# Patient Record
Sex: MALE | Race: WHITE | NOT HISPANIC OR LATINO | Employment: OTHER | URBAN - METROPOLITAN AREA
[De-identification: names, ages, dates, MRNs, and addresses within clinical notes are randomized per-mention and may not be internally consistent; named-entity substitution may affect disease eponyms.]

---

## 2017-10-17 ENCOUNTER — OFFICE VISIT (OUTPATIENT)
Dept: NEUROSURGERY | Facility: CLINIC | Age: 82
End: 2017-10-17

## 2017-10-17 VITALS
WEIGHT: 183 LBS | BODY MASS INDEX: 26.2 KG/M2 | DIASTOLIC BLOOD PRESSURE: 78 MMHG | TEMPERATURE: 98.2 F | SYSTOLIC BLOOD PRESSURE: 136 MMHG | HEIGHT: 70 IN

## 2017-10-17 DIAGNOSIS — M47.812 CERVICAL SPONDYLOSIS WITHOUT MYELOPATHY: ICD-10-CM

## 2017-10-17 DIAGNOSIS — I63.9 CEREBROVASCULAR ACCIDENT (CVA), UNSPECIFIED MECHANISM (HCC): ICD-10-CM

## 2017-10-17 DIAGNOSIS — I65.23 CAROTID STENOSIS, BILATERAL: Primary | ICD-10-CM

## 2017-10-17 PROCEDURE — 99214 OFFICE O/P EST MOD 30 MIN: CPT | Performed by: PHYSICIAN ASSISTANT

## 2017-10-17 RX ORDER — ASPIRIN 81 MG/1
81 TABLET ORAL DAILY
COMMUNITY
End: 2023-01-24

## 2017-10-17 RX ORDER — ROSUVASTATIN CALCIUM 10 MG/1
10 TABLET, COATED ORAL DAILY
COMMUNITY
Start: 2017-10-03

## 2017-10-17 RX ORDER — OMEPRAZOLE 20 MG/1
20 CAPSULE, DELAYED RELEASE ORAL DAILY
COMMUNITY
End: 2023-01-24

## 2017-10-17 RX ORDER — LISINOPRIL 10 MG/1
TABLET ORAL
Status: ON HOLD | COMMUNITY
Start: 2017-08-14 | End: 2023-01-24

## 2017-10-17 RX ORDER — DOXAZOSIN MESYLATE 4 MG/1
TABLET ORAL
COMMUNITY
Start: 2017-09-04 | End: 2023-01-24

## 2017-10-17 NOTE — PROGRESS NOTES
Patient: Jose Penn  : 1928  Chart #: [id number]    Date of Service: 10/17/2017    CHIEF COMPLAINT: Carotid stenosis     History of Present Illness Mr. Penn is an 89 year old gentleman who has a history of asymptomatic carotid stenosis with severe greater than 70%, near 90% stenosis proximal left internal carotid artery, considered stable.  Per patient's report, he apparently suffered a right sided TIA in November of last year.  He felt like he suffered a similar event 3 weeks ago but was not evaluated by a doctor.  He suddenly felt dizzy and transiently had blurred vision.  No focal neurological deficit.  He has not had recent duplex studies.      The following portions of the patient's history were reviewed and updated as appropriate: allergies, current medications, past family history, past medical history, past social history, past surgical history and problem list.    Review of Systems   Constitutional: Positive for activity change, appetite change and fatigue. Negative for chills, diaphoresis, fever and unexpected weight change.   HENT: Positive for hearing loss. Negative for congestion, dental problem, drooling, ear discharge, ear pain, facial swelling, mouth sores, nosebleeds, postnasal drip, rhinorrhea, sinus pressure, sneezing, sore throat, tinnitus, trouble swallowing and voice change.    Eyes: Positive for visual disturbance. Negative for photophobia, pain, discharge, redness and itching.   Respiratory: Negative for apnea, cough, choking, chest tightness, shortness of breath, wheezing and stridor.    Cardiovascular: Negative for chest pain, palpitations and leg swelling.   Gastrointestinal: Negative for abdominal distention, abdominal pain, anal bleeding, blood in stool, constipation, diarrhea, nausea, rectal pain and vomiting.   Endocrine: Negative for cold intolerance, heat intolerance, polydipsia, polyphagia and polyuria.   Genitourinary: Negative for decreased urine volume, difficulty  "urinating, dysuria, enuresis, flank pain, frequency, genital sores, hematuria and urgency.   Musculoskeletal: Negative for arthralgias, back pain, gait problem, joint swelling, myalgias, neck pain and neck stiffness.   Skin: Negative for color change, pallor, rash and wound.   Allergic/Immunologic: Negative for environmental allergies, food allergies and immunocompromised state.   Neurological: Positive for dizziness and headaches. Negative for tremors, seizures, syncope, facial asymmetry, speech difficulty, weakness, light-headedness and numbness.   Hematological: Negative for adenopathy. Bruises/bleeds easily.   Psychiatric/Behavioral: Negative for agitation, behavioral problems, confusion, decreased concentration, dysphoric mood, hallucinations, self-injury, sleep disturbance and suicidal ideas. The patient is not nervous/anxious and is not hyperactive.        Objective   Vital Signs: Blood pressure 136/78, temperature 98.2 °F (36.8 °C), height 70\" (177.8 cm), weight 183 lb (83 kg).  Physical Exam   Constitutional: He is oriented to person, place, and time. He appears well-developed and well-nourished. No distress.   HENT:   Head: Normocephalic and atraumatic.   Eyes: EOM are normal. Pupils are equal, round, and reactive to light.   Neck: Carotid bruit is not present.   Cardiovascular: Normal rate, regular rhythm and normal heart sounds.    Pulmonary/Chest: Effort normal and breath sounds normal.   Abdominal: Soft. He exhibits no distension. There is no tenderness.   Musculoskeletal: Normal range of motion. He exhibits no tenderness or deformity.   Neurological: He is oriented to person, place, and time. No cranial nerve deficit. Coordination normal.   Psychiatric: He has a normal mood and affect. His behavior is normal. Thought content normal.   Nursing note and vitals reviewed.      Assessment/Plan    Diagnosis: Carotid stenosis     Plan:  MRI of brain with and without contrast and bilateral carotid duplex. " Patient will follow up with Dr. Diaz on the same day and further recommendations will be made.                Latoya Love PA-C  Patient Care Team:  Anil King MD as PCP - General (Family Medicine)  Anil King MD as Referring Physician (Family Medicine)

## 2017-11-09 ENCOUNTER — OFFICE VISIT (OUTPATIENT)
Dept: NEUROSURGERY | Facility: CLINIC | Age: 82
End: 2017-11-09

## 2017-11-09 ENCOUNTER — HOSPITAL ENCOUNTER (OUTPATIENT)
Dept: MRI IMAGING | Facility: HOSPITAL | Age: 82
Discharge: HOME | End: 2017-11-09

## 2017-11-09 ENCOUNTER — HOSPITAL ENCOUNTER (OUTPATIENT)
Dept: CARDIOLOGY | Facility: HOSPITAL | Age: 82
Discharge: HOME | End: 2017-11-09
Admitting: PHYSICIAN ASSISTANT

## 2017-11-09 VITALS
WEIGHT: 183.2 LBS | BODY MASS INDEX: 27.13 KG/M2 | TEMPERATURE: 98.5 F | DIASTOLIC BLOOD PRESSURE: 68 MMHG | SYSTOLIC BLOOD PRESSURE: 132 MMHG | HEIGHT: 69 IN

## 2017-11-09 DIAGNOSIS — M47.812 CERVICAL SPONDYLOSIS WITHOUT MYELOPATHY: ICD-10-CM

## 2017-11-09 DIAGNOSIS — I65.29 STENOSIS OF CAROTID ARTERY, UNSPECIFIED LATERALITY: Primary | ICD-10-CM

## 2017-11-09 DIAGNOSIS — I63.9 CEREBROVASCULAR ACCIDENT (CVA), UNSPECIFIED MECHANISM (HCC): ICD-10-CM

## 2017-11-09 DIAGNOSIS — I65.23 CAROTID STENOSIS, BILATERAL: ICD-10-CM

## 2017-11-09 DIAGNOSIS — I79.8 OTHER DISORDERS OF ARTERIES, ARTERIOLES AND CAPILLARIES IN DISEASES CLASSIFIED ELSEWHERE (HCC): ICD-10-CM

## 2017-11-09 PROBLEM — R51.9 HEADACHE: Status: ACTIVE | Noted: 2017-11-09

## 2017-11-09 PROBLEM — M19.90 ARTHRITIS: Status: ACTIVE | Noted: 2017-11-09

## 2017-11-09 LAB
BH CV XLRA MEAS LEFT CCA RATIO VEL: 95.1 CM/SEC
BH CV XLRA MEAS LEFT DIST CCA EDV: 17.3 CM/SEC
BH CV XLRA MEAS LEFT DIST CCA PSV: 95.1 CM/SEC
BH CV XLRA MEAS LEFT DIST ICA EDV: 32.7 CM/SEC
BH CV XLRA MEAS LEFT DIST ICA PSV: 138 CM/SEC
BH CV XLRA MEAS LEFT ICA RATIO VEL: 314 CM/SEC
BH CV XLRA MEAS LEFT ICA/CCA RATIO: 3.3
BH CV XLRA MEAS LEFT MID CCA EDV: 22.6 CM/SEC
BH CV XLRA MEAS LEFT MID CCA PSV: 100 CM/SEC
BH CV XLRA MEAS LEFT MID ICA EDV: 79.8 CM/SEC
BH CV XLRA MEAS LEFT MID ICA PSV: 314 CM/SEC
BH CV XLRA MEAS LEFT PROX CCA EDV: 20.2 CM/SEC
BH CV XLRA MEAS LEFT PROX CCA PSV: 118 CM/SEC
BH CV XLRA MEAS LEFT PROX ECA PSV: 245 CM/SEC
BH CV XLRA MEAS LEFT PROX ICA EDV: 106 CM/SEC
BH CV XLRA MEAS LEFT PROX ICA PSV: 314 CM/SEC
BH CV XLRA MEAS LEFT PROX SCLA PSV: 201 CM/SEC
BH CV XLRA MEAS LEFT VERTEBRAL A PSV: 64.4 CM/SEC
BH CV XLRA MEAS RIGHT CCA RATIO VEL: 107 CM/SEC
BH CV XLRA MEAS RIGHT DIST CCA EDV: 26.2 CM/SEC
BH CV XLRA MEAS RIGHT DIST CCA PSV: 107 CM/SEC
BH CV XLRA MEAS RIGHT DIST ICA EDV: 26.2 CM/SEC
BH CV XLRA MEAS RIGHT DIST ICA PSV: 110 CM/SEC
BH CV XLRA MEAS RIGHT ICA RATIO VEL: 94.3 CM/SEC
BH CV XLRA MEAS RIGHT ICA/CCA RATIO: 0.88
BH CV XLRA MEAS RIGHT MID CCA EDV: 21 CM/SEC
BH CV XLRA MEAS RIGHT MID CCA PSV: 109 CM/SEC
BH CV XLRA MEAS RIGHT MID ICA EDV: 28.8 CM/SEC
BH CV XLRA MEAS RIGHT MID ICA PSV: 94.3 CM/SEC
BH CV XLRA MEAS RIGHT PROX CCA EDV: 24.4 CM/SEC
BH CV XLRA MEAS RIGHT PROX CCA PSV: 135 CM/SEC
BH CV XLRA MEAS RIGHT PROX ECA PSV: 159 CM/SEC
BH CV XLRA MEAS RIGHT PROX ICA EDV: 27.9 CM/SEC
BH CV XLRA MEAS RIGHT PROX ICA PSV: 87.3 CM/SEC
BH CV XLRA MEAS RIGHT PROX SCLA PSV: 115 CM/SEC
BH CV XLRA MEAS RIGHT VERTEBRAL A PSV: 34.7 CM/SEC
LEFT ARM BP: NORMAL MMHG
RIGHT ARM BP: NORMAL MMHG

## 2017-11-09 PROCEDURE — 70553 MRI BRAIN STEM W/O & W/DYE: CPT

## 2017-11-09 PROCEDURE — 0 GADOBENATE DIMEGLUMINE 529 MG/ML SOLUTION: Performed by: PHYSICIAN ASSISTANT

## 2017-11-09 PROCEDURE — 93880 EXTRACRANIAL BILAT STUDY: CPT | Performed by: INTERNAL MEDICINE

## 2017-11-09 PROCEDURE — 93880 EXTRACRANIAL BILAT STUDY: CPT

## 2017-11-09 PROCEDURE — 82565 ASSAY OF CREATININE: CPT

## 2017-11-09 PROCEDURE — A9577 INJ MULTIHANCE: HCPCS | Performed by: PHYSICIAN ASSISTANT

## 2017-11-09 PROCEDURE — 99214 OFFICE O/P EST MOD 30 MIN: CPT | Performed by: PHYSICIAN ASSISTANT

## 2017-11-09 RX ORDER — CLOPIDOGREL BISULFATE 75 MG/1
75 TABLET ORAL EVERY OTHER DAY
Qty: 30 TABLET | Refills: 6 | Status: ON HOLD | OUTPATIENT
Start: 2017-11-09 | End: 2023-01-24

## 2017-11-09 RX ADMIN — GADOBENATE DIMEGLUMINE 8 ML: 529 INJECTION, SOLUTION INTRAVENOUS at 15:12

## 2017-11-09 NOTE — PROGRESS NOTES
Subjective   Patient ID: Jose Penn is a 89 y.o. male  [id number]    Chief Complaint   Patient presents with   • Carotid Artery Disease     History of Present Illness  Mr. Penn is an 89 year old gentleman who has a history of asymptomatic carotid stenosis with severe greater than 70%, near 90% stenosis proximal left internal carotid artery, considered stable.  Per patient's report, he apparently suffered a right sided TIA in November of last year.  He felt like he suffered a similar event 3 weeks ago but was not evaluated by a doctor.  He suddenly felt dizzy and transiently had blurred vision.  No focal neurological deficit of weakness in the arm or leg.  He has new studies today for review. He has not been seen by opthalmology.    Current Outpatient Prescriptions:   •  aspirin 81 MG EC tablet, Take 81 mg by mouth Daily., Disp: , Rfl:   •  doxazosin (CARDURA) 4 MG tablet, , Disp: , Rfl:   •  lisinopril (PRINIVIL,ZESTRIL) 10 MG tablet, , Disp: , Rfl:   •  omeprazole (priLOSEC) 20 MG capsule, Take 20 mg by mouth Daily., Disp: , Rfl:   •  rosuvastatin (CRESTOR) 10 MG tablet, , Disp: , Rfl:   •  sertraline (ZOLOFT) 50 MG tablet, , Disp: , Rfl:   •  clopidogrel (PLAVIX) 75 MG tablet, Take 1 tablet by mouth Every Other Day., Disp: 30 tablet, Rfl: 6    Allergies   Allergen Reactions   • Droperidol    • Penicillins    • Reglan [Metoclopramide]    • Sulfa Antibiotics      Review of Systems   Constitutional: Negative for activity change, appetite change, chills, diaphoresis, fatigue, fever and unexpected weight change.   HENT: Negative for congestion, dental problem, drooling, ear discharge, ear pain, facial swelling, hearing loss, mouth sores, nosebleeds, postnasal drip, rhinorrhea, sinus pressure, sneezing, sore throat, tinnitus, trouble swallowing and voice change.    Eyes: Negative for photophobia, pain, discharge, redness, itching and visual disturbance.   Respiratory: Negative for apnea, cough, choking, chest  tightness, shortness of breath, wheezing and stridor.    Cardiovascular: Negative for chest pain, palpitations and leg swelling.   Gastrointestinal: Negative for abdominal distention, abdominal pain, anal bleeding, blood in stool, constipation, diarrhea, nausea, rectal pain and vomiting.   Endocrine: Negative for cold intolerance, heat intolerance, polydipsia, polyphagia and polyuria.   Genitourinary: Negative for decreased urine volume, difficulty urinating, dysuria, enuresis, flank pain, frequency, genital sores, hematuria and urgency.   Musculoskeletal: Negative for arthralgias, back pain, gait problem, joint swelling, myalgias, neck pain and neck stiffness.   Skin: Negative for color change, pallor, rash and wound.   Allergic/Immunologic: Negative for environmental allergies, food allergies and immunocompromised state.   Neurological: Positive for headaches. Negative for dizziness, tremors, seizures, syncope, facial asymmetry, speech difficulty, weakness, light-headedness and numbness.   Hematological: Negative for adenopathy. Does not bruise/bleed easily.   Psychiatric/Behavioral: Negative for agitation, behavioral problems, confusion, decreased concentration, dysphoric mood, hallucinations, self-injury, sleep disturbance and suicidal ideas. The patient is not nervous/anxious and is not hyperactive.    All other systems reviewed and are negative.      Physical Exam   Constitutional: He is oriented to person, place, and time. He appears well-developed and well-nourished.   HENT:   Head: Normocephalic and atraumatic.   Eyes: Conjunctivae are normal.   Neck: Neck supple.   Cardiovascular: Normal rate.    Pulmonary/Chest: Effort normal.   Neurological: He is alert and oriented to person, place, and time. Gait normal.   Skin: Skin is warm and dry.   Psychiatric: He has a normal mood and affect. His behavior is normal. Judgment and thought content normal.     /68 (BP Location: Left arm, Patient Position:  "Sitting, Cuff Size: Adult)  Temp 98.5 °F (36.9 °C) (Temporal Artery )   Ht 69\" (175.3 cm)  Wt 183 lb 3.2 oz (83.1 kg)  BMI 27.05 kg/m2    Neurologic Exam     Mental Status   Oriented to person, place, and time.     Cranial Nerves   Cranial nerves II through XII intact.     Gait, Coordination, and Reflexes     Gait  Gait: normal    Tremor   Resting tremor: absent  No carotid bruits are noted bilaterally.    Independent Review of Radiographic Studies:   MRI of the brain done 11/9/2017 is reviewed with Dr. Ramon in detail and does not show evidence of prior stroke, there are no flow voids noted.  His ventricles are of normal size, there is no hemorrhage, no abnormal contrast enhancement.    Medical Decision Making:   I have reviewed the MRI scan with Dr. Ramon who does not see evidence of a recent stroke therefore we would like to pursue further testing to determine if this is cardiac or other source in nature.I have asked the patient to see ophthalmology for an eye exam and call me after this is obtained so that we can review the results.  He is starting Plavix 75 mg every other day, I have reviewed the risk benefits and reasoning of this medication and all of their questions were answered.  Our plan will be a follow-up carotid study in 6 months.  We will also obtain a copy of the scan that he had at [facility] last year and evaluate as to any cardiac workup, if there is no cardiac evaluation there and we will set him up to see cardiology and plan on seeing him back in our office as scheduled.  His been a pleasure providing neurosurgical evaluation.    Valencia Avila PA-C                    "

## 2017-11-10 LAB — CREAT BLDA-MCNC: 1.5 MG/DL (ref 0.6–1.3)

## 2018-01-16 ENCOUNTER — DOCUMENTATION (OUTPATIENT)
Dept: NEUROSURGERY | Facility: CLINIC | Age: 83
End: 2018-01-16

## 2018-01-16 DIAGNOSIS — I65.22 STENOSIS OF LEFT CAROTID ARTERY: Primary | ICD-10-CM

## 2018-01-16 DIAGNOSIS — G45.9 TRANSIENT CEREBRAL ISCHEMIA, UNSPECIFIED TYPE: ICD-10-CM

## 2018-01-16 NOTE — PROGRESS NOTES
Patient overall is doing well.  He is taking his Plavix every other day and tolerating this without any difficulties.  Our plan is to see him back in March with repeat Doppler studies for comparison of his carotid Doppler and his left internal carotid artery stenosis.    Valencia Avila PA-C

## 2018-03-13 ENCOUNTER — HOSPITAL ENCOUNTER (OUTPATIENT)
Dept: CARDIOLOGY | Facility: HOSPITAL | Age: 83
Discharge: HOME | End: 2018-03-13
Admitting: PHYSICIAN ASSISTANT

## 2018-03-13 ENCOUNTER — OFFICE VISIT (OUTPATIENT)
Dept: NEUROSURGERY | Facility: CLINIC | Age: 83
End: 2018-03-13

## 2018-03-13 VITALS
DIASTOLIC BLOOD PRESSURE: 58 MMHG | BODY MASS INDEX: 27.11 KG/M2 | TEMPERATURE: 98 F | WEIGHT: 183 LBS | HEIGHT: 69 IN | SYSTOLIC BLOOD PRESSURE: 98 MMHG

## 2018-03-13 DIAGNOSIS — I63.9 CEREBROVASCULAR ACCIDENT (CVA), UNSPECIFIED MECHANISM (HCC): ICD-10-CM

## 2018-03-13 DIAGNOSIS — I65.23 BILATERAL CAROTID ARTERY STENOSIS: Primary | ICD-10-CM

## 2018-03-13 LAB
BH CV ECHO MEAS - BSA(HAYCOCK): 2 M^2
BH CV ECHO MEAS - BSA: 2 M^2
BH CV ECHO MEAS - BZI_BMI: 27 KILOGRAMS/M^2
BH CV ECHO MEAS - BZI_METRIC_HEIGHT: 175.3 CM
BH CV ECHO MEAS - BZI_METRIC_WEIGHT: 83 KG
BH CV XLRA MEAS LEFT CCA RATIO VEL: 103 CM/SEC
BH CV XLRA MEAS LEFT DIST CCA EDV: 19.9 CM/SEC
BH CV XLRA MEAS LEFT DIST CCA PSV: 88.8 CM/SEC
BH CV XLRA MEAS LEFT DIST ICA EDV: 37.7 CM/SEC
BH CV XLRA MEAS LEFT DIST ICA PSV: 179.8 CM/SEC
BH CV XLRA MEAS LEFT ICA RATIO VEL: 469 CM/SEC
BH CV XLRA MEAS LEFT ICA/CCA RATIO: 4.6
BH CV XLRA MEAS LEFT MID CCA EDV: 21 CM/SEC
BH CV XLRA MEAS LEFT MID CCA PSV: 103.1 CM/SEC
BH CV XLRA MEAS LEFT MID ICA EDV: 112 CM/SEC
BH CV XLRA MEAS LEFT MID ICA PSV: 436.1 CM/SEC
BH CV XLRA MEAS LEFT PROX CCA EDV: 21.6 CM/SEC
BH CV XLRA MEAS LEFT PROX CCA PSV: 136.5 CM/SEC
BH CV XLRA MEAS LEFT PROX ECA PSV: 209.9 CM/SEC
BH CV XLRA MEAS LEFT PROX ICA EDV: 167 CM/SEC
BH CV XLRA MEAS LEFT PROX ICA PSV: 469 CM/SEC
BH CV XLRA MEAS LEFT PROX SCLA PSV: 153.2 CM/SEC
BH CV XLRA MEAS LEFT VERTEBRAL A EDV: 14.8 CM/SEC
BH CV XLRA MEAS LEFT VERTEBRAL A PSV: 70.3 CM/SEC
BH CV XLRA MEAS RIGHT CCA RATIO VEL: 91.1 CM/SEC
BH CV XLRA MEAS RIGHT DIST CCA EDV: 20.1 CM/SEC
BH CV XLRA MEAS RIGHT DIST CCA PSV: 87.4 CM/SEC
BH CV XLRA MEAS RIGHT DIST ICA EDV: 33 CM/SEC
BH CV XLRA MEAS RIGHT DIST ICA PSV: 117.9 CM/SEC
BH CV XLRA MEAS RIGHT ICA RATIO VEL: 140 CM/SEC
BH CV XLRA MEAS RIGHT ICA/CCA RATIO: 1.5
BH CV XLRA MEAS RIGHT MID CCA EDV: 17.6 CM/SEC
BH CV XLRA MEAS RIGHT MID CCA PSV: 91.8 CM/SEC
BH CV XLRA MEAS RIGHT MID ICA EDV: 40.9 CM/SEC
BH CV XLRA MEAS RIGHT MID ICA PSV: 140 CM/SEC
BH CV XLRA MEAS RIGHT PROX CCA EDV: 20.1 CM/SEC
BH CV XLRA MEAS RIGHT PROX CCA PSV: 110 CM/SEC
BH CV XLRA MEAS RIGHT PROX ECA PSV: 148.4 CM/SEC
BH CV XLRA MEAS RIGHT PROX ICA EDV: 35.4 CM/SEC
BH CV XLRA MEAS RIGHT PROX ICA PSV: 122 CM/SEC
BH CV XLRA MEAS RIGHT PROX SCLA PSV: 121.8 CM/SEC
BH CV XLRA MEAS RIGHT VERTEBRAL A EDV: 9.8 CM/SEC
BH CV XLRA MEAS RIGHT VERTEBRAL A PSV: 44.2 CM/SEC
LEFT ARM BP: NORMAL MMHG
RIGHT ARM BP: NORMAL MMHG

## 2018-03-13 PROCEDURE — 93880 EXTRACRANIAL BILAT STUDY: CPT

## 2018-03-13 PROCEDURE — 93880 EXTRACRANIAL BILAT STUDY: CPT | Performed by: INTERNAL MEDICINE

## 2018-03-13 PROCEDURE — 99213 OFFICE O/P EST LOW 20 MIN: CPT | Performed by: PHYSICIAN ASSISTANT

## 2018-03-13 RX ORDER — POLYETHYLENE GLYCOL 3350 17 G/17G
POWDER, FOR SOLUTION ORAL
COMMUNITY
Start: 2018-01-03 | End: 2023-01-24

## 2018-03-13 RX ORDER — MONTELUKAST SODIUM 10 MG/1
TABLET ORAL
Status: ON HOLD | COMMUNITY
Start: 2018-02-19 | End: 2023-01-24

## 2018-03-13 RX ORDER — PANTOPRAZOLE SODIUM 40 MG/1
TABLET, DELAYED RELEASE ORAL
COMMUNITY
Start: 2018-03-10 | End: 2023-01-24

## 2018-03-14 NOTE — PROGRESS NOTES
Subjective   Patient: Jose Penn  : 1928  Chart #: [id number]    Date of Service: 2018    CHIEF COMPLAINT: HA's    HPI: patient with known left carotid stenosis that is was last seen in 2017, here for f/u with new carotid duplex. Since his last visit he has not had any new symptoms of ataxia, weakness, vision deficits, dizziness or speech problems. He has had some surgery on his eyelids and will require more later. His vision is stable. He has been feeling tired, no energy for several months. He has been found to have some low b/p in the past and was off his b/p meds for awhile not back on Lisinopril 10mg daily. Today his b/p is low.    PMH:   Past Medical History:   Diagnosis Date   • Arthritis    • Carotid stenosis    • Headache    • Hypertension    • Stroke        Social History:   Social History   Substance Use Topics   • Smoking status: Former Smoker     Quit date:    • Smokeless tobacco: Never Used   • Alcohol use No       Allergies: Droperidol; Penicillins; Reglan [metoclopramide]; and Sulfa antibiotics    Medications:   Current Outpatient Prescriptions:   •  aspirin 81 MG EC tablet, Take 81 mg by mouth Daily., Disp: , Rfl:   •  clopidogrel (PLAVIX) 75 MG tablet, Take 1 tablet by mouth Every Other Day., Disp: 30 tablet, Rfl: 6  •  doxazosin (CARDURA) 4 MG tablet, , Disp: , Rfl:   •  lisinopril (PRINIVIL,ZESTRIL) 10 MG tablet, , Disp: , Rfl:   •  montelukast (SINGULAIR) 10 MG tablet, , Disp: , Rfl:   •  omeprazole (priLOSEC) 20 MG capsule, Take 20 mg by mouth Daily., Disp: , Rfl:   •  pantoprazole (PROTONIX) 40 MG EC tablet, , Disp: , Rfl:   •  polyethylene glycol (MIRALAX) powder, , Disp: , Rfl:   •  rosuvastatin (CRESTOR) 10 MG tablet, , Disp: , Rfl:   •  sertraline (ZOLOFT) 50 MG tablet, , Disp: , Rfl:     Review of Systems  Constitutional: Positive for activity change, appetite change and fatigue. Negative for chills, diaphoresis, fever and unexpected weight change.   HENT: Positive  for hearing loss. Negative for congestion, dental problem, drooling, ear discharge, ear pain, facial swelling, mouth sores, nosebleeds, postnasal drip, rhinorrhea, sinus pressure, sneezing, sore throat, tinnitus, trouble swallowing and voice change.    Eyes: Positive for visual disturbance. Negative for photophobia, pain, discharge, redness and itching.   Respiratory: Negative for apnea, cough, choking, chest tightness, shortness of breath, wheezing and stridor.    Cardiovascular: Negative for chest pain, palpitations and leg swelling.   Gastrointestinal: Negative for abdominal distention, abdominal pain, anal bleeding, blood in stool, constipation, diarrhea, nausea, rectal pain and vomiting.   Endocrine: Negative for cold intolerance, heat intolerance, polydipsia, polyphagia and polyuria.   Genitourinary: Negative for decreased urine volume, difficulty urinating, dysuria, enuresis, flank pain, frequency, genital sores, hematuria and urgency.   Musculoskeletal: Negative for arthralgias, back pain, gait problem, joint swelling, myalgias, neck pain and neck stiffness.   Skin: Negative for color change, pallor, rash and wound.   Allergic/Immunologic: Negative for environmental allergies, food allergies and immunocompromised state.   Neurological: Positive for dizziness and headaches. Negative for tremors, seizures, syncope, facial asymmetry, speech difficulty, weakness, light-headedness and numbness.   Hematological: Negative for adenopathy. Bruises/bleeds easily.   Psychiatric/Behavioral: Negative for agitation, behavioral problems, confusion, decreased concentration, dysphoric mood, hallucinations, self-injury, sleep disturbance and suicidal ideas. The patient is not nervous/anxious and is not hyperactive.    Objective     Neurologic Exam     Mental Status   Oriented to person, place, and time.   Attention: normal.   Speech: speech is normal   Level of consciousness: alert  Knowledge: good.     Cranial Nerves    Cranial nerves II through XII intact.     Motor Exam   Muscle bulk: normal  Overall muscle tone: normal    Gait, Coordination, and Reflexes     Gait  Gait: normal    Tremor   Resting tremor: absent  Intention tremor: absent  Action tremor: absent      IMAGING/STUDIES: carotid duplex  Blood Pressure 142/70 mmHg       141/70 mmHg            Carotid Velocities - Right Side      Systolic Diastolic   CCA Prox 110 cm/sec       20.1 cm/sec         CCA Mid 91.8 cm/sec       17.6 cm/sec         CCA Dist 87.4 cm/sec       20.1 cm/sec         ICA Prox 122 cm/sec       35.4 cm/sec         ICA Mid 140 cm/sec       40.9 cm/sec         ICA Dist 117.9 cm/sec       33 cm/sec         .4 cm/sec             Vertebral 44.2 cm/sec       9.8 cm/sec         Subclavian 121.8 cm/sec             ICA/CCA 1.5                 Carotid Velocities - Left Side      Systolic Diastolic   CCA Prox 136.5 cm/sec       21.6 cm/sec         CCA Mid 103.1 cm/sec       21 cm/sec         CCA Dist 88.8 cm/sec       19.9 cm/sec         ICA Prox 469 cm/sec       167 cm/sec         ICA Mid 436.1 cm/sec       112 cm/sec         ICA Dist 179.8 cm/sec       37.7 cm/sec         .9 cm/sec             Vertebral 70.3 cm/sec       14.8 cm/sec         Subclavian 153.2 cm/sec             ICA/CCA 4.6                  Assessment/Plan   There has been some progression in the carotid stenosis on the left with increased velocities and ratios and mild progression on the right but thoses velocities are still within normal range. I will discuss and review the progression with Dr. Diaz and decide if will progress with further studies or continue to monitor. He is currently on Plavix QOD. I will call the patient after I discuss with Dr. Diaz.        Valencia Avila PA-C  Patient Care Team:  Anil King MD as PCP - General (Family Medicine)  Anil King MD as Referring Physician (Family Medicine)

## 2018-10-17 ENCOUNTER — HOSPITAL ENCOUNTER (OUTPATIENT)
Dept: CARDIOLOGY | Facility: HOSPITAL | Age: 83
Discharge: HOME | End: 2018-10-17
Attending: COLON & RECTAL SURGERY | Admitting: COLON & RECTAL SURGERY

## 2018-10-17 ENCOUNTER — TRANSCRIBE ORDERS (OUTPATIENT)
Dept: ADMINISTRATIVE | Facility: HOSPITAL | Age: 83
End: 2018-10-17

## 2018-10-17 DIAGNOSIS — I10 HYPERTENSION, UNSPECIFIED TYPE: ICD-10-CM

## 2018-10-17 DIAGNOSIS — I10 HYPERTENSION, UNSPECIFIED TYPE: Primary | ICD-10-CM

## 2018-10-17 PROCEDURE — 93010 ELECTROCARDIOGRAM REPORT: CPT | Performed by: INTERNAL MEDICINE

## 2018-10-17 PROCEDURE — 93005 ELECTROCARDIOGRAM TRACING: CPT | Performed by: COLON & RECTAL SURGERY

## 2019-12-13 ENCOUNTER — LAB REQUISITION (OUTPATIENT)
Dept: LAB | Facility: HOSPITAL | Age: 84
End: 2019-12-13

## 2019-12-13 DIAGNOSIS — K62.81 ANAL SPHINCTER TEAR (HEALED) (NONTRAUMATIC) (OLD): ICD-10-CM

## 2019-12-13 PROCEDURE — 88304 TISSUE EXAM BY PATHOLOGIST: CPT | Performed by: COLON & RECTAL SURGERY

## 2019-12-20 LAB
CYTO UR: NORMAL
LAB AP CASE REPORT: NORMAL
LAB AP CLINICAL INFORMATION: NORMAL
PATH REPORT.FINAL DX SPEC: NORMAL
PATH REPORT.GROSS SPEC: NORMAL

## 2022-08-30 ENCOUNTER — TRANSCRIBE ORDERS (OUTPATIENT)
Dept: LAB | Facility: HOSPITAL | Age: 87
End: 2022-08-30

## 2022-08-30 ENCOUNTER — HOSPITAL ENCOUNTER (OUTPATIENT)
Dept: GENERAL RADIOLOGY | Facility: HOSPITAL | Age: 87
Discharge: HOME | End: 2022-08-30
Admitting: NURSE PRACTITIONER

## 2022-08-30 DIAGNOSIS — R05.1 ACUTE COUGH: ICD-10-CM

## 2022-08-30 DIAGNOSIS — R05.1 ACUTE COUGH: Primary | ICD-10-CM

## 2022-08-30 PROCEDURE — 71046 X-RAY EXAM CHEST 2 VIEWS: CPT | Performed by: RADIOLOGY

## 2022-08-30 PROCEDURE — 71046 X-RAY EXAM CHEST 2 VIEWS: CPT

## 2022-09-09 ENCOUNTER — TRANSCRIBE ORDERS (OUTPATIENT)
Dept: LAB | Facility: HOSPITAL | Age: 87
End: 2022-09-09

## 2022-09-09 ENCOUNTER — HOSPITAL ENCOUNTER (OUTPATIENT)
Dept: ULTRASOUND IMAGING | Facility: HOSPITAL | Age: 87
Discharge: HOME | End: 2022-09-09
Admitting: FAMILY MEDICINE

## 2022-09-09 DIAGNOSIS — I66.8 OCCLUSION AND STENOSIS OF OTHER CEREBRAL ARTERIES: Primary | ICD-10-CM

## 2022-09-09 DIAGNOSIS — I66.8 OCCLUSION AND STENOSIS OF OTHER CEREBRAL ARTERIES: ICD-10-CM

## 2022-09-09 PROCEDURE — 93880 EXTRACRANIAL BILAT STUDY: CPT | Performed by: RADIOLOGY

## 2022-09-09 PROCEDURE — 93880 EXTRACRANIAL BILAT STUDY: CPT

## 2023-01-24 ENCOUNTER — HOSPITAL ENCOUNTER (INPATIENT)
Facility: HOSPITAL | Age: 88
LOS: 10 days | Discharge: HOME | DRG: 560 | End: 2023-02-03
Attending: FAMILY MEDICINE | Admitting: FAMILY MEDICINE
Payer: MEDICARE

## 2023-01-24 PROBLEM — T07.XXXA MULTIPLE TRAUMA: Status: ACTIVE | Noted: 2023-01-24

## 2023-01-24 PROCEDURE — 99223 1ST HOSP IP/OBS HIGH 75: CPT | Performed by: FAMILY MEDICINE

## 2023-01-24 PROCEDURE — 94799 UNLISTED PULMONARY SVC/PX: CPT

## 2023-01-24 RX ORDER — DIPHENOXYLATE HYDROCHLORIDE AND ATROPINE SULFATE 2.5; .025 MG/1; MG/1
1 TABLET ORAL DAILY
Status: DISCONTINUED | OUTPATIENT
Start: 2023-01-25 | End: 2023-02-03 | Stop reason: HOSPADM

## 2023-01-24 RX ORDER — METHOCARBAMOL 500 MG/1
1000 TABLET, FILM COATED ORAL EVERY 8 HOURS PRN
Status: DISCONTINUED | OUTPATIENT
Start: 2023-01-24 | End: 2023-02-02

## 2023-01-24 RX ORDER — NITROGLYCERIN 0.4 MG/1
0.4 TABLET SUBLINGUAL
Status: DISCONTINUED | OUTPATIENT
Start: 2023-01-24 | End: 2023-02-03 | Stop reason: HOSPADM

## 2023-01-24 RX ORDER — ARFORMOTEROL TARTRATE 15 UG/2ML
15 SOLUTION RESPIRATORY (INHALATION)
COMMUNITY

## 2023-01-24 RX ORDER — NITROGLYCERIN 0.4 MG/1
0.4 TABLET SUBLINGUAL
COMMUNITY

## 2023-01-24 RX ORDER — POLYETHYLENE GLYCOL 3350 17 G/17G
17 POWDER, FOR SOLUTION ORAL DAILY PRN
Status: DISCONTINUED | OUTPATIENT
Start: 2023-01-24 | End: 2023-02-03 | Stop reason: HOSPADM

## 2023-01-24 RX ORDER — ROSUVASTATIN CALCIUM 10 MG/1
10 TABLET, COATED ORAL DAILY
Status: CANCELLED | OUTPATIENT
Start: 2023-01-24

## 2023-01-24 RX ORDER — REVEFENACIN 175 UG/3ML
175 SOLUTION RESPIRATORY (INHALATION)
COMMUNITY

## 2023-01-24 RX ORDER — ASPIRIN 81 MG/1
81 TABLET ORAL DAILY
Status: DISCONTINUED | OUTPATIENT
Start: 2023-01-25 | End: 2023-02-03 | Stop reason: HOSPADM

## 2023-01-24 RX ORDER — BUDESONIDE 0.5 MG/2ML
0.5 INHALANT ORAL
Status: DISCONTINUED | OUTPATIENT
Start: 2023-01-24 | End: 2023-02-03 | Stop reason: HOSPADM

## 2023-01-24 RX ORDER — PANTOPRAZOLE SODIUM 40 MG/1
40 TABLET, DELAYED RELEASE ORAL
Status: DISCONTINUED | OUTPATIENT
Start: 2023-01-25 | End: 2023-02-03 | Stop reason: HOSPADM

## 2023-01-24 RX ORDER — MULTIPLE VITAMINS W/ MINERALS TAB 9MG-400MCG
1 TAB ORAL DAILY
COMMUNITY

## 2023-01-24 RX ORDER — OXYCODONE HYDROCHLORIDE 5 MG/1
5 TABLET ORAL EVERY 6 HOURS PRN
Status: DISCONTINUED | OUTPATIENT
Start: 2023-01-24 | End: 2023-01-31

## 2023-01-24 RX ORDER — ONDANSETRON 4 MG/1
4 TABLET, FILM COATED ORAL EVERY 6 HOURS PRN
Status: DISCONTINUED | OUTPATIENT
Start: 2023-01-24 | End: 2023-02-03 | Stop reason: HOSPADM

## 2023-01-24 RX ORDER — DOXAZOSIN MESYLATE 4 MG/1
4 TABLET ORAL NIGHTLY
Status: ON HOLD | COMMUNITY
End: 2023-02-02 | Stop reason: SDUPTHER

## 2023-01-24 RX ORDER — DOCUSATE SODIUM 100 MG/1
100 CAPSULE, LIQUID FILLED ORAL 2 TIMES DAILY
Status: DISCONTINUED | OUTPATIENT
Start: 2023-01-24 | End: 2023-02-03 | Stop reason: HOSPADM

## 2023-01-24 RX ORDER — BUDESONIDE 0.5 MG/2ML
0.5 INHALANT ORAL
COMMUNITY

## 2023-01-24 RX ORDER — ACETAMINOPHEN 325 MG/1
650 TABLET ORAL EVERY 4 HOURS PRN
Status: DISCONTINUED | OUTPATIENT
Start: 2023-01-24 | End: 2023-02-03 | Stop reason: HOSPADM

## 2023-01-24 RX ORDER — ARFORMOTEROL TARTRATE 15 UG/2ML
15 SOLUTION RESPIRATORY (INHALATION)
Status: DISCONTINUED | OUTPATIENT
Start: 2023-01-24 | End: 2023-02-03 | Stop reason: HOSPADM

## 2023-01-24 RX ORDER — IPRATROPIUM BROMIDE AND ALBUTEROL SULFATE 2.5; .5 MG/3ML; MG/3ML
3 SOLUTION RESPIRATORY (INHALATION) DAILY
Status: ON HOLD | COMMUNITY
End: 2023-02-02 | Stop reason: SDUPTHER

## 2023-01-24 RX ORDER — IBUPROFEN 200 MG
1 TABLET ORAL EVERY 12 HOURS SCHEDULED
Status: DISCONTINUED | OUTPATIENT
Start: 2023-01-24 | End: 2023-02-03 | Stop reason: HOSPADM

## 2023-01-24 RX ORDER — ASPIRIN 81 MG/1
81 TABLET ORAL DAILY
COMMUNITY

## 2023-01-24 RX ORDER — TERAZOSIN 1 MG/1
1 CAPSULE ORAL NIGHTLY
Status: DISCONTINUED | OUTPATIENT
Start: 2023-01-24 | End: 2023-02-03 | Stop reason: HOSPADM

## 2023-01-24 RX ORDER — ROSUVASTATIN CALCIUM 10 MG/1
10 TABLET, COATED ORAL DAILY
Status: DISCONTINUED | OUTPATIENT
Start: 2023-01-25 | End: 2023-02-03 | Stop reason: HOSPADM

## 2023-01-24 RX ADMIN — ACETAMINOPHEN 325 MG: 325 TABLET ORAL at 20:27

## 2023-01-24 RX ADMIN — ARFORMOTEROL TARTRATE 15 MCG: 15 SOLUTION RESPIRATORY (INHALATION) at 21:16

## 2023-01-24 RX ADMIN — APIXABAN 2.5 MG: 2.5 TABLET, FILM COATED ORAL at 18:03

## 2023-01-24 RX ADMIN — TERAZOSIN HYDROCHLORIDE 1 MG: 1 CAPSULE ORAL at 21:48

## 2023-01-24 RX ADMIN — METOPROLOL TARTRATE 12.5 MG: 25 TABLET, FILM COATED ORAL at 20:27

## 2023-01-24 RX ADMIN — DOCUSATE SODIUM 100 MG: 100 CAPSULE ORAL at 20:27

## 2023-01-24 RX ADMIN — BUDESONIDE 0.5 MG: 0.5 SUSPENSION RESPIRATORY (INHALATION) at 21:16

## 2023-01-24 RX ADMIN — BACITRACIN ZINC NEOMYCIN SULFATE POLYMYXIN B SULFATE 1 APPLICATION: 400; 3.5; 5 OINTMENT TOPICAL at 20:27

## 2023-01-25 LAB
ALBUMIN SERPL-MCNC: 3.4 G/DL (ref 3.5–5.2)
ALBUMIN/GLOB SERPL: 1.3 G/DL
ALP SERPL-CCNC: 70 U/L (ref 39–117)
ALT SERPL W P-5'-P-CCNC: 51 U/L (ref 1–41)
ANION GAP SERPL CALCULATED.3IONS-SCNC: 8 MMOL/L (ref 5–15)
AST SERPL-CCNC: 53 U/L (ref 1–40)
BASOPHILS # BLD AUTO: 0.06 10*3/MM3 (ref 0–0.2)
BASOPHILS NFR BLD AUTO: 0.7 % (ref 0–1.5)
BILIRUB SERPL-MCNC: 0.7 MG/DL (ref 0–1.2)
BUN SERPL-MCNC: 28 MG/DL (ref 8–23)
BUN/CREAT SERPL: 21.4 (ref 7–25)
CALCIUM SPEC-SCNC: 8.8 MG/DL (ref 8.2–9.6)
CHLORIDE SERPL-SCNC: 105 MMOL/L (ref 98–107)
CO2 SERPL-SCNC: 25 MMOL/L (ref 22–29)
CREAT SERPL-MCNC: 1.31 MG/DL (ref 0.76–1.27)
DEPRECATED RDW RBC AUTO: 46.9 FL (ref 37–54)
EGFRCR SERPLBLD CKD-EPI 2021: 50.4 ML/MIN/1.73
EOSINOPHIL # BLD AUTO: 0.66 10*3/MM3 (ref 0–0.4)
EOSINOPHIL NFR BLD AUTO: 7.3 % (ref 0.3–6.2)
ERYTHROCYTE [DISTWIDTH] IN BLOOD BY AUTOMATED COUNT: 13.4 % (ref 12.3–15.4)
GLOBULIN UR ELPH-MCNC: 2.6 GM/DL
GLUCOSE SERPL-MCNC: 131 MG/DL (ref 65–99)
HCT VFR BLD AUTO: 33.7 % (ref 37.5–51)
HGB BLD-MCNC: 11.1 G/DL (ref 13–17.7)
IMM GRANULOCYTES # BLD AUTO: 0.08 10*3/MM3 (ref 0–0.05)
IMM GRANULOCYTES NFR BLD AUTO: 0.9 % (ref 0–0.5)
LYMPHOCYTES # BLD AUTO: 1.16 10*3/MM3 (ref 0.7–3.1)
LYMPHOCYTES NFR BLD AUTO: 12.7 % (ref 19.6–45.3)
MAGNESIUM SERPL-MCNC: 2.2 MG/DL (ref 1.7–2.3)
MCH RBC QN AUTO: 31.9 PG (ref 26.6–33)
MCHC RBC AUTO-ENTMCNC: 32.9 G/DL (ref 31.5–35.7)
MCV RBC AUTO: 96.8 FL (ref 79–97)
MONOCYTES # BLD AUTO: 0.78 10*3/MM3 (ref 0.1–0.9)
MONOCYTES NFR BLD AUTO: 8.6 % (ref 5–12)
NEUTROPHILS NFR BLD AUTO: 6.36 10*3/MM3 (ref 1.7–7)
NEUTROPHILS NFR BLD AUTO: 69.8 % (ref 42.7–76)
NRBC BLD AUTO-RTO: 0 /100 WBC (ref 0–0.2)
PLATELET # BLD AUTO: 196 10*3/MM3 (ref 140–450)
PMV BLD AUTO: 10.1 FL (ref 6–12)
POTASSIUM SERPL-SCNC: 5 MMOL/L (ref 3.5–5.2)
PROT SERPL-MCNC: 6 G/DL (ref 6–8.5)
RBC # BLD AUTO: 3.48 10*6/MM3 (ref 4.14–5.8)
SODIUM SERPL-SCNC: 138 MMOL/L (ref 136–145)
WBC NRBC COR # BLD: 9.1 10*3/MM3 (ref 3.4–10.8)

## 2023-01-25 PROCEDURE — 97110 THERAPEUTIC EXERCISES: CPT

## 2023-01-25 PROCEDURE — 83735 ASSAY OF MAGNESIUM: CPT | Performed by: FAMILY MEDICINE

## 2023-01-25 PROCEDURE — 63710000001 REVEFENACIN 175 MCG/3ML SOLUTION: Performed by: FAMILY MEDICINE

## 2023-01-25 PROCEDURE — 97161 PT EVAL LOW COMPLEX 20 MIN: CPT

## 2023-01-25 PROCEDURE — 80053 COMPREHEN METABOLIC PANEL: CPT | Performed by: FAMILY MEDICINE

## 2023-01-25 PROCEDURE — 97166 OT EVAL MOD COMPLEX 45 MIN: CPT

## 2023-01-25 PROCEDURE — 85025 COMPLETE CBC W/AUTO DIFF WBC: CPT | Performed by: FAMILY MEDICINE

## 2023-01-25 PROCEDURE — 97530 THERAPEUTIC ACTIVITIES: CPT

## 2023-01-25 PROCEDURE — 94640 AIRWAY INHALATION TREATMENT: CPT

## 2023-01-25 PROCEDURE — 94799 UNLISTED PULMONARY SVC/PX: CPT

## 2023-01-25 PROCEDURE — 94761 N-INVAS EAR/PLS OXIMETRY MLT: CPT

## 2023-01-25 PROCEDURE — 99231 SBSQ HOSP IP/OBS SF/LOW 25: CPT | Performed by: FAMILY MEDICINE

## 2023-01-25 PROCEDURE — 63710000001 DIPHENHYDRAMINE PER 50 MG: Performed by: FAMILY MEDICINE

## 2023-01-25 PROCEDURE — 97116 GAIT TRAINING THERAPY: CPT

## 2023-01-25 RX ORDER — DIPHENHYDRAMINE HCL 25 MG
25 CAPSULE ORAL NIGHTLY
Status: DISCONTINUED | OUTPATIENT
Start: 2023-01-25 | End: 2023-01-26

## 2023-01-25 RX ORDER — CALCIUM CARBONATE 200(500)MG
2 TABLET,CHEWABLE ORAL 3 TIMES DAILY PRN
Status: DISCONTINUED | OUTPATIENT
Start: 2023-01-25 | End: 2023-02-03 | Stop reason: HOSPADM

## 2023-01-25 RX ORDER — ACETAMINOPHEN 325 MG/1
325 TABLET ORAL NIGHTLY
Status: DISCONTINUED | OUTPATIENT
Start: 2023-01-25 | End: 2023-02-03 | Stop reason: HOSPADM

## 2023-01-25 RX ADMIN — SERTRALINE 50 MG: 50 TABLET, FILM COATED ORAL at 08:47

## 2023-01-25 RX ADMIN — ARFORMOTEROL TARTRATE 15 MCG: 15 SOLUTION RESPIRATORY (INHALATION) at 19:12

## 2023-01-25 RX ADMIN — TERAZOSIN HYDROCHLORIDE 1 MG: 1 CAPSULE ORAL at 21:04

## 2023-01-25 RX ADMIN — BUDESONIDE 0.5 MG: 0.5 SUSPENSION RESPIRATORY (INHALATION) at 19:12

## 2023-01-25 RX ADMIN — BACITRACIN ZINC NEOMYCIN SULFATE POLYMYXIN B SULFATE 1 APPLICATION: 400; 3.5; 5 OINTMENT TOPICAL at 08:45

## 2023-01-25 RX ADMIN — DOCUSATE SODIUM 100 MG: 100 CAPSULE ORAL at 21:04

## 2023-01-25 RX ADMIN — APIXABAN 2.5 MG: 2.5 TABLET, FILM COATED ORAL at 08:45

## 2023-01-25 RX ADMIN — PANTOPRAZOLE SODIUM 40 MG: 40 TABLET, DELAYED RELEASE ORAL at 05:10

## 2023-01-25 RX ADMIN — REVEFENACIN 175 MCG: 175 SOLUTION RESPIRATORY (INHALATION) at 08:04

## 2023-01-25 RX ADMIN — OXYCODONE HYDROCHLORIDE 5 MG: 5 TABLET ORAL at 08:48

## 2023-01-25 RX ADMIN — DIPHENHYDRAMINE HYDROCHLORIDE 25 MG: 25 CAPSULE ORAL at 21:04

## 2023-01-25 RX ADMIN — ACETAMINOPHEN 325 MG: 325 TABLET ORAL at 21:05

## 2023-01-25 RX ADMIN — ROSUVASTATIN CALCIUM 10 MG: 10 TABLET, FILM COATED ORAL at 08:45

## 2023-01-25 RX ADMIN — BACITRACIN ZINC NEOMYCIN SULFATE POLYMYXIN B SULFATE 1 APPLICATION: 400; 3.5; 5 OINTMENT TOPICAL at 21:26

## 2023-01-25 RX ADMIN — DOCUSATE SODIUM 100 MG: 100 CAPSULE ORAL at 08:45

## 2023-01-25 RX ADMIN — APIXABAN 2.5 MG: 2.5 TABLET, FILM COATED ORAL at 21:04

## 2023-01-25 RX ADMIN — METOPROLOL TARTRATE 12.5 MG: 25 TABLET, FILM COATED ORAL at 21:04

## 2023-01-25 RX ADMIN — Medication 1 TABLET: at 08:47

## 2023-01-25 RX ADMIN — ASPIRIN 81 MG: 81 TABLET, COATED ORAL at 08:47

## 2023-01-25 RX ADMIN — BUDESONIDE 0.5 MG: 0.5 SUSPENSION RESPIRATORY (INHALATION) at 08:04

## 2023-01-25 RX ADMIN — ANTACID TABLETS 2 TABLET: 500 TABLET, CHEWABLE ORAL at 15:40

## 2023-01-25 RX ADMIN — ARFORMOTEROL TARTRATE 15 MCG: 15 SOLUTION RESPIRATORY (INHALATION) at 08:04

## 2023-01-25 RX ADMIN — METOPROLOL TARTRATE 12.5 MG: 25 TABLET, FILM COATED ORAL at 08:45

## 2023-01-26 PROCEDURE — 97110 THERAPEUTIC EXERCISES: CPT

## 2023-01-26 PROCEDURE — 97530 THERAPEUTIC ACTIVITIES: CPT

## 2023-01-26 PROCEDURE — 94761 N-INVAS EAR/PLS OXIMETRY MLT: CPT

## 2023-01-26 PROCEDURE — 97116 GAIT TRAINING THERAPY: CPT

## 2023-01-26 PROCEDURE — 94799 UNLISTED PULMONARY SVC/PX: CPT

## 2023-01-26 PROCEDURE — 63710000001 REVEFENACIN 175 MCG/3ML SOLUTION: Performed by: FAMILY MEDICINE

## 2023-01-26 PROCEDURE — 99231 SBSQ HOSP IP/OBS SF/LOW 25: CPT | Performed by: FAMILY MEDICINE

## 2023-01-26 PROCEDURE — 97535 SELF CARE MNGMENT TRAINING: CPT

## 2023-01-26 RX ORDER — DIPHENHYDRAMINE HCL 12.5MG/5ML
12.5 LIQUID (ML) ORAL NIGHTLY
Status: DISCONTINUED | OUTPATIENT
Start: 2023-01-26 | End: 2023-02-03 | Stop reason: HOSPADM

## 2023-01-26 RX ADMIN — ARFORMOTEROL TARTRATE 15 MCG: 15 SOLUTION RESPIRATORY (INHALATION) at 19:35

## 2023-01-26 RX ADMIN — APIXABAN 2.5 MG: 2.5 TABLET, FILM COATED ORAL at 21:22

## 2023-01-26 RX ADMIN — REVEFENACIN 175 MCG: 175 SOLUTION RESPIRATORY (INHALATION) at 08:10

## 2023-01-26 RX ADMIN — BUDESONIDE 0.5 MG: 0.5 SUSPENSION RESPIRATORY (INHALATION) at 19:35

## 2023-01-26 RX ADMIN — DIPHENHYDRAMINE HYDROCHLORIDE 12.5 MG: 12.5 SOLUTION ORAL at 21:23

## 2023-01-26 RX ADMIN — BACITRACIN ZINC NEOMYCIN SULFATE POLYMYXIN B SULFATE 1 APPLICATION: 400; 3.5; 5 OINTMENT TOPICAL at 21:23

## 2023-01-26 RX ADMIN — DOCUSATE SODIUM 100 MG: 100 CAPSULE ORAL at 08:21

## 2023-01-26 RX ADMIN — ASPIRIN 81 MG: 81 TABLET, COATED ORAL at 08:21

## 2023-01-26 RX ADMIN — METOPROLOL TARTRATE 12.5 MG: 25 TABLET, FILM COATED ORAL at 21:22

## 2023-01-26 RX ADMIN — BACITRACIN ZINC NEOMYCIN SULFATE POLYMYXIN B SULFATE 1 APPLICATION: 400; 3.5; 5 OINTMENT TOPICAL at 08:19

## 2023-01-26 RX ADMIN — TERAZOSIN HYDROCHLORIDE 1 MG: 1 CAPSULE ORAL at 21:21

## 2023-01-26 RX ADMIN — DOCUSATE SODIUM 100 MG: 100 CAPSULE ORAL at 21:21

## 2023-01-26 RX ADMIN — METOPROLOL TARTRATE 12.5 MG: 25 TABLET, FILM COATED ORAL at 08:20

## 2023-01-26 RX ADMIN — APIXABAN 2.5 MG: 2.5 TABLET, FILM COATED ORAL at 08:20

## 2023-01-26 RX ADMIN — SERTRALINE 50 MG: 50 TABLET, FILM COATED ORAL at 08:21

## 2023-01-26 RX ADMIN — OXYCODONE HYDROCHLORIDE 5 MG: 5 TABLET ORAL at 21:21

## 2023-01-26 RX ADMIN — PANTOPRAZOLE SODIUM 40 MG: 40 TABLET, DELAYED RELEASE ORAL at 05:14

## 2023-01-26 RX ADMIN — ARFORMOTEROL TARTRATE 15 MCG: 15 SOLUTION RESPIRATORY (INHALATION) at 07:57

## 2023-01-26 RX ADMIN — BUDESONIDE 0.5 MG: 0.5 SUSPENSION RESPIRATORY (INHALATION) at 07:57

## 2023-01-26 RX ADMIN — ACETAMINOPHEN 325 MG: 325 TABLET ORAL at 21:21

## 2023-01-26 RX ADMIN — Medication 1 TABLET: at 08:21

## 2023-01-26 RX ADMIN — ROSUVASTATIN CALCIUM 10 MG: 10 TABLET, FILM COATED ORAL at 08:20

## 2023-01-27 LAB
ANION GAP SERPL CALCULATED.3IONS-SCNC: 9.8 MMOL/L (ref 5–15)
BASOPHILS # BLD AUTO: 0.04 10*3/MM3 (ref 0–0.2)
BASOPHILS NFR BLD AUTO: 0.5 % (ref 0–1.5)
BUN SERPL-MCNC: 34 MG/DL (ref 8–23)
BUN/CREAT SERPL: 24.5 (ref 7–25)
CALCIUM SPEC-SCNC: 8.6 MG/DL (ref 8.2–9.6)
CHLORIDE SERPL-SCNC: 106 MMOL/L (ref 98–107)
CO2 SERPL-SCNC: 23.2 MMOL/L (ref 22–29)
CREAT SERPL-MCNC: 1.39 MG/DL (ref 0.76–1.27)
DEPRECATED RDW RBC AUTO: 46.9 FL (ref 37–54)
EGFRCR SERPLBLD CKD-EPI 2021: 47 ML/MIN/1.73
EOSINOPHIL # BLD AUTO: 0.43 10*3/MM3 (ref 0–0.4)
EOSINOPHIL NFR BLD AUTO: 4.8 % (ref 0.3–6.2)
ERYTHROCYTE [DISTWIDTH] IN BLOOD BY AUTOMATED COUNT: 13.3 % (ref 12.3–15.4)
GLUCOSE SERPL-MCNC: 123 MG/DL (ref 65–99)
HCT VFR BLD AUTO: 34.7 % (ref 37.5–51)
HGB BLD-MCNC: 11.4 G/DL (ref 13–17.7)
IMM GRANULOCYTES # BLD AUTO: 0.08 10*3/MM3 (ref 0–0.05)
IMM GRANULOCYTES NFR BLD AUTO: 0.9 % (ref 0–0.5)
LYMPHOCYTES # BLD AUTO: 1.19 10*3/MM3 (ref 0.7–3.1)
LYMPHOCYTES NFR BLD AUTO: 13.4 % (ref 19.6–45.3)
MCH RBC QN AUTO: 31.8 PG (ref 26.6–33)
MCHC RBC AUTO-ENTMCNC: 32.9 G/DL (ref 31.5–35.7)
MCV RBC AUTO: 96.9 FL (ref 79–97)
MONOCYTES # BLD AUTO: 0.82 10*3/MM3 (ref 0.1–0.9)
MONOCYTES NFR BLD AUTO: 9.2 % (ref 5–12)
NEUTROPHILS NFR BLD AUTO: 6.31 10*3/MM3 (ref 1.7–7)
NEUTROPHILS NFR BLD AUTO: 71.2 % (ref 42.7–76)
NRBC BLD AUTO-RTO: 0 /100 WBC (ref 0–0.2)
PLATELET # BLD AUTO: 240 10*3/MM3 (ref 140–450)
PMV BLD AUTO: 10.1 FL (ref 6–12)
POTASSIUM SERPL-SCNC: 4.7 MMOL/L (ref 3.5–5.2)
RBC # BLD AUTO: 3.58 10*6/MM3 (ref 4.14–5.8)
SODIUM SERPL-SCNC: 139 MMOL/L (ref 136–145)
WBC NRBC COR # BLD: 8.87 10*3/MM3 (ref 3.4–10.8)

## 2023-01-27 PROCEDURE — 97110 THERAPEUTIC EXERCISES: CPT

## 2023-01-27 PROCEDURE — 97530 THERAPEUTIC ACTIVITIES: CPT

## 2023-01-27 PROCEDURE — 94799 UNLISTED PULMONARY SVC/PX: CPT

## 2023-01-27 PROCEDURE — 99231 SBSQ HOSP IP/OBS SF/LOW 25: CPT | Performed by: FAMILY MEDICINE

## 2023-01-27 PROCEDURE — 97116 GAIT TRAINING THERAPY: CPT

## 2023-01-27 PROCEDURE — 85025 COMPLETE CBC W/AUTO DIFF WBC: CPT | Performed by: FAMILY MEDICINE

## 2023-01-27 PROCEDURE — 97535 SELF CARE MNGMENT TRAINING: CPT

## 2023-01-27 PROCEDURE — 94761 N-INVAS EAR/PLS OXIMETRY MLT: CPT

## 2023-01-27 PROCEDURE — 80048 BASIC METABOLIC PNL TOTAL CA: CPT | Performed by: FAMILY MEDICINE

## 2023-01-27 RX ADMIN — Medication 1 TABLET: at 09:00

## 2023-01-27 RX ADMIN — DIPHENHYDRAMINE HYDROCHLORIDE 12.5 MG: 12.5 SOLUTION ORAL at 20:40

## 2023-01-27 RX ADMIN — ARFORMOTEROL TARTRATE 15 MCG: 15 SOLUTION RESPIRATORY (INHALATION) at 19:55

## 2023-01-27 RX ADMIN — APIXABAN 2.5 MG: 2.5 TABLET, FILM COATED ORAL at 20:38

## 2023-01-27 RX ADMIN — TERAZOSIN HYDROCHLORIDE 1 MG: 1 CAPSULE ORAL at 20:37

## 2023-01-27 RX ADMIN — SERTRALINE 50 MG: 50 TABLET, FILM COATED ORAL at 09:00

## 2023-01-27 RX ADMIN — ARFORMOTEROL TARTRATE 15 MCG: 15 SOLUTION RESPIRATORY (INHALATION) at 07:06

## 2023-01-27 RX ADMIN — BUDESONIDE 0.5 MG: 0.5 SUSPENSION RESPIRATORY (INHALATION) at 07:06

## 2023-01-27 RX ADMIN — APIXABAN 2.5 MG: 2.5 TABLET, FILM COATED ORAL at 09:00

## 2023-01-27 RX ADMIN — BACITRACIN ZINC NEOMYCIN SULFATE POLYMYXIN B SULFATE 1 APPLICATION: 400; 3.5; 5 OINTMENT TOPICAL at 09:02

## 2023-01-27 RX ADMIN — METOPROLOL TARTRATE 12.5 MG: 25 TABLET, FILM COATED ORAL at 20:38

## 2023-01-27 RX ADMIN — DOCUSATE SODIUM 100 MG: 100 CAPSULE ORAL at 20:37

## 2023-01-27 RX ADMIN — DOCUSATE SODIUM 100 MG: 100 CAPSULE ORAL at 09:01

## 2023-01-27 RX ADMIN — METOPROLOL TARTRATE 12.5 MG: 25 TABLET, FILM COATED ORAL at 09:00

## 2023-01-27 RX ADMIN — ACETAMINOPHEN 325 MG: 325 TABLET ORAL at 20:37

## 2023-01-27 RX ADMIN — PANTOPRAZOLE SODIUM 40 MG: 40 TABLET, DELAYED RELEASE ORAL at 05:49

## 2023-01-27 RX ADMIN — ROSUVASTATIN CALCIUM 10 MG: 10 TABLET, FILM COATED ORAL at 09:00

## 2023-01-27 RX ADMIN — ASPIRIN 81 MG: 81 TABLET, COATED ORAL at 09:01

## 2023-01-27 RX ADMIN — BACITRACIN ZINC NEOMYCIN SULFATE POLYMYXIN B SULFATE 1 APPLICATION: 400; 3.5; 5 OINTMENT TOPICAL at 20:40

## 2023-01-27 RX ADMIN — BUDESONIDE 0.5 MG: 0.5 SUSPENSION RESPIRATORY (INHALATION) at 19:55

## 2023-01-28 PROCEDURE — 97535 SELF CARE MNGMENT TRAINING: CPT

## 2023-01-28 PROCEDURE — 97116 GAIT TRAINING THERAPY: CPT

## 2023-01-28 PROCEDURE — 97110 THERAPEUTIC EXERCISES: CPT

## 2023-01-28 PROCEDURE — 97530 THERAPEUTIC ACTIVITIES: CPT

## 2023-01-28 PROCEDURE — 63710000001 REVEFENACIN 175 MCG/3ML SOLUTION: Performed by: FAMILY MEDICINE

## 2023-01-28 PROCEDURE — 94799 UNLISTED PULMONARY SVC/PX: CPT

## 2023-01-28 PROCEDURE — 94760 N-INVAS EAR/PLS OXIMETRY 1: CPT

## 2023-01-28 PROCEDURE — 97112 NEUROMUSCULAR REEDUCATION: CPT

## 2023-01-28 PROCEDURE — 99231 SBSQ HOSP IP/OBS SF/LOW 25: CPT | Performed by: FAMILY MEDICINE

## 2023-01-28 RX ADMIN — BUDESONIDE 0.5 MG: 0.5 SUSPENSION RESPIRATORY (INHALATION) at 08:05

## 2023-01-28 RX ADMIN — POLYETHYLENE GLYCOL (3350) 17 G: 17 POWDER, FOR SOLUTION ORAL at 09:20

## 2023-01-28 RX ADMIN — ARFORMOTEROL TARTRATE 15 MCG: 15 SOLUTION RESPIRATORY (INHALATION) at 08:04

## 2023-01-28 RX ADMIN — ROSUVASTATIN CALCIUM 10 MG: 10 TABLET, FILM COATED ORAL at 09:22

## 2023-01-28 RX ADMIN — BUDESONIDE 0.5 MG: 0.5 SUSPENSION RESPIRATORY (INHALATION) at 19:28

## 2023-01-28 RX ADMIN — SERTRALINE 50 MG: 50 TABLET, FILM COATED ORAL at 09:22

## 2023-01-28 RX ADMIN — METOPROLOL TARTRATE 12.5 MG: 25 TABLET, FILM COATED ORAL at 22:01

## 2023-01-28 RX ADMIN — TERAZOSIN HYDROCHLORIDE 1 MG: 1 CAPSULE ORAL at 22:02

## 2023-01-28 RX ADMIN — BACITRACIN ZINC NEOMYCIN SULFATE POLYMYXIN B SULFATE 1 APPLICATION: 400; 3.5; 5 OINTMENT TOPICAL at 22:00

## 2023-01-28 RX ADMIN — ARFORMOTEROL TARTRATE 15 MCG: 15 SOLUTION RESPIRATORY (INHALATION) at 19:28

## 2023-01-28 RX ADMIN — DOCUSATE SODIUM 100 MG: 100 CAPSULE ORAL at 22:01

## 2023-01-28 RX ADMIN — DOCUSATE SODIUM 100 MG: 100 CAPSULE ORAL at 09:21

## 2023-01-28 RX ADMIN — ACETAMINOPHEN 325 MG: 325 TABLET ORAL at 21:58

## 2023-01-28 RX ADMIN — ASPIRIN 81 MG: 81 TABLET, COATED ORAL at 09:21

## 2023-01-28 RX ADMIN — METOPROLOL TARTRATE 12.5 MG: 25 TABLET, FILM COATED ORAL at 09:21

## 2023-01-28 RX ADMIN — APIXABAN 2.5 MG: 2.5 TABLET, FILM COATED ORAL at 09:21

## 2023-01-28 RX ADMIN — OXYCODONE HYDROCHLORIDE 5 MG: 5 TABLET ORAL at 21:59

## 2023-01-28 RX ADMIN — Medication 1 TABLET: at 09:22

## 2023-01-28 RX ADMIN — BACITRACIN ZINC NEOMYCIN SULFATE POLYMYXIN B SULFATE 1 APPLICATION: 400; 3.5; 5 OINTMENT TOPICAL at 09:22

## 2023-01-28 RX ADMIN — DIPHENHYDRAMINE HYDROCHLORIDE 12.5 MG: 12.5 SOLUTION ORAL at 21:59

## 2023-01-28 RX ADMIN — APIXABAN 2.5 MG: 2.5 TABLET, FILM COATED ORAL at 22:01

## 2023-01-28 RX ADMIN — PANTOPRAZOLE SODIUM 40 MG: 40 TABLET, DELAYED RELEASE ORAL at 05:53

## 2023-01-28 RX ADMIN — REVEFENACIN 175 MCG: 175 SOLUTION RESPIRATORY (INHALATION) at 08:14

## 2023-01-29 LAB
ANION GAP SERPL CALCULATED.3IONS-SCNC: 12 MMOL/L (ref 5–15)
BASOPHILS # BLD AUTO: 0.05 10*3/MM3 (ref 0–0.2)
BASOPHILS NFR BLD AUTO: 0.6 % (ref 0–1.5)
BUN SERPL-MCNC: 35 MG/DL (ref 8–23)
BUN/CREAT SERPL: 25.2 (ref 7–25)
CALCIUM SPEC-SCNC: 8.5 MG/DL (ref 8.2–9.6)
CHLORIDE SERPL-SCNC: 105 MMOL/L (ref 98–107)
CO2 SERPL-SCNC: 20 MMOL/L (ref 22–29)
CREAT SERPL-MCNC: 1.39 MG/DL (ref 0.76–1.27)
DEPRECATED RDW RBC AUTO: 48 FL (ref 37–54)
EGFRCR SERPLBLD CKD-EPI 2021: 47 ML/MIN/1.73
EOSINOPHIL # BLD AUTO: 0.54 10*3/MM3 (ref 0–0.4)
EOSINOPHIL NFR BLD AUTO: 6 % (ref 0.3–6.2)
ERYTHROCYTE [DISTWIDTH] IN BLOOD BY AUTOMATED COUNT: 13.8 % (ref 12.3–15.4)
GLUCOSE SERPL-MCNC: 142 MG/DL (ref 65–99)
HCT VFR BLD AUTO: 34.6 % (ref 37.5–51)
HGB BLD-MCNC: 11 G/DL (ref 13–17.7)
IMM GRANULOCYTES # BLD AUTO: 0.05 10*3/MM3 (ref 0–0.05)
IMM GRANULOCYTES NFR BLD AUTO: 0.6 % (ref 0–0.5)
LYMPHOCYTES # BLD AUTO: 1.03 10*3/MM3 (ref 0.7–3.1)
LYMPHOCYTES NFR BLD AUTO: 11.4 % (ref 19.6–45.3)
MCH RBC QN AUTO: 31.1 PG (ref 26.6–33)
MCHC RBC AUTO-ENTMCNC: 31.8 G/DL (ref 31.5–35.7)
MCV RBC AUTO: 97.7 FL (ref 79–97)
MONOCYTES # BLD AUTO: 0.65 10*3/MM3 (ref 0.1–0.9)
MONOCYTES NFR BLD AUTO: 7.2 % (ref 5–12)
NEUTROPHILS NFR BLD AUTO: 6.75 10*3/MM3 (ref 1.7–7)
NEUTROPHILS NFR BLD AUTO: 74.2 % (ref 42.7–76)
NRBC BLD AUTO-RTO: 0 /100 WBC (ref 0–0.2)
PLATELET # BLD AUTO: 284 10*3/MM3 (ref 140–450)
PMV BLD AUTO: 10.1 FL (ref 6–12)
POTASSIUM SERPL-SCNC: 4.3 MMOL/L (ref 3.5–5.2)
RBC # BLD AUTO: 3.54 10*6/MM3 (ref 4.14–5.8)
SODIUM SERPL-SCNC: 137 MMOL/L (ref 136–145)
WBC NRBC COR # BLD: 9.07 10*3/MM3 (ref 3.4–10.8)

## 2023-01-29 PROCEDURE — 94799 UNLISTED PULMONARY SVC/PX: CPT

## 2023-01-29 PROCEDURE — 63710000001 REVEFENACIN 175 MCG/3ML SOLUTION: Performed by: FAMILY MEDICINE

## 2023-01-29 PROCEDURE — 94761 N-INVAS EAR/PLS OXIMETRY MLT: CPT

## 2023-01-29 PROCEDURE — 85025 COMPLETE CBC W/AUTO DIFF WBC: CPT | Performed by: FAMILY MEDICINE

## 2023-01-29 PROCEDURE — 80048 BASIC METABOLIC PNL TOTAL CA: CPT | Performed by: FAMILY MEDICINE

## 2023-01-29 PROCEDURE — 94760 N-INVAS EAR/PLS OXIMETRY 1: CPT

## 2023-01-29 RX ADMIN — METOPROLOL TARTRATE 12.5 MG: 25 TABLET, FILM COATED ORAL at 08:44

## 2023-01-29 RX ADMIN — BACITRACIN ZINC NEOMYCIN SULFATE POLYMYXIN B SULFATE 1 APPLICATION: 400; 3.5; 5 OINTMENT TOPICAL at 08:45

## 2023-01-29 RX ADMIN — TERAZOSIN HYDROCHLORIDE 1 MG: 1 CAPSULE ORAL at 21:06

## 2023-01-29 RX ADMIN — BUDESONIDE 0.5 MG: 0.5 SUSPENSION RESPIRATORY (INHALATION) at 08:07

## 2023-01-29 RX ADMIN — Medication 1 TABLET: at 08:45

## 2023-01-29 RX ADMIN — DOCUSATE SODIUM 100 MG: 100 CAPSULE ORAL at 21:05

## 2023-01-29 RX ADMIN — BACITRACIN ZINC NEOMYCIN SULFATE POLYMYXIN B SULFATE 1 APPLICATION: 400; 3.5; 5 OINTMENT TOPICAL at 21:07

## 2023-01-29 RX ADMIN — ARFORMOTEROL TARTRATE 15 MCG: 15 SOLUTION RESPIRATORY (INHALATION) at 20:02

## 2023-01-29 RX ADMIN — APIXABAN 2.5 MG: 2.5 TABLET, FILM COATED ORAL at 08:44

## 2023-01-29 RX ADMIN — SERTRALINE 50 MG: 50 TABLET, FILM COATED ORAL at 08:45

## 2023-01-29 RX ADMIN — ARFORMOTEROL TARTRATE 15 MCG: 15 SOLUTION RESPIRATORY (INHALATION) at 08:06

## 2023-01-29 RX ADMIN — ROSUVASTATIN CALCIUM 10 MG: 10 TABLET, FILM COATED ORAL at 08:45

## 2023-01-29 RX ADMIN — ASPIRIN 81 MG: 81 TABLET, COATED ORAL at 08:44

## 2023-01-29 RX ADMIN — PANTOPRAZOLE SODIUM 40 MG: 40 TABLET, DELAYED RELEASE ORAL at 05:48

## 2023-01-29 RX ADMIN — APIXABAN 2.5 MG: 2.5 TABLET, FILM COATED ORAL at 21:05

## 2023-01-29 RX ADMIN — ACETAMINOPHEN 325 MG: 325 TABLET ORAL at 21:04

## 2023-01-29 RX ADMIN — DOCUSATE SODIUM 100 MG: 100 CAPSULE ORAL at 08:44

## 2023-01-29 RX ADMIN — BUDESONIDE 0.5 MG: 0.5 SUSPENSION RESPIRATORY (INHALATION) at 20:02

## 2023-01-29 RX ADMIN — REVEFENACIN 175 MCG: 175 SOLUTION RESPIRATORY (INHALATION) at 08:18

## 2023-01-29 RX ADMIN — METOPROLOL TARTRATE 12.5 MG: 25 TABLET, FILM COATED ORAL at 21:05

## 2023-01-29 RX ADMIN — DIPHENHYDRAMINE HYDROCHLORIDE 12.5 MG: 12.5 SOLUTION ORAL at 21:06

## 2023-01-29 RX ADMIN — OXYCODONE HYDROCHLORIDE 5 MG: 5 TABLET ORAL at 21:05

## 2023-01-30 PROCEDURE — 97530 THERAPEUTIC ACTIVITIES: CPT

## 2023-01-30 PROCEDURE — 94799 UNLISTED PULMONARY SVC/PX: CPT

## 2023-01-30 PROCEDURE — 63710000001 REVEFENACIN 175 MCG/3ML SOLUTION: Performed by: FAMILY MEDICINE

## 2023-01-30 PROCEDURE — 97116 GAIT TRAINING THERAPY: CPT

## 2023-01-30 PROCEDURE — 97535 SELF CARE MNGMENT TRAINING: CPT

## 2023-01-30 PROCEDURE — 97110 THERAPEUTIC EXERCISES: CPT

## 2023-01-30 PROCEDURE — 94761 N-INVAS EAR/PLS OXIMETRY MLT: CPT

## 2023-01-30 RX ADMIN — ARFORMOTEROL TARTRATE 15 MCG: 15 SOLUTION RESPIRATORY (INHALATION) at 19:38

## 2023-01-30 RX ADMIN — DIPHENHYDRAMINE HYDROCHLORIDE 12.5 MG: 12.5 SOLUTION ORAL at 20:58

## 2023-01-30 RX ADMIN — Medication 1 TABLET: at 10:17

## 2023-01-30 RX ADMIN — METOPROLOL TARTRATE 12.5 MG: 25 TABLET, FILM COATED ORAL at 10:17

## 2023-01-30 RX ADMIN — ACETAMINOPHEN 325 MG: 325 TABLET ORAL at 20:59

## 2023-01-30 RX ADMIN — BUDESONIDE 0.5 MG: 0.5 SUSPENSION RESPIRATORY (INHALATION) at 07:51

## 2023-01-30 RX ADMIN — TERAZOSIN HYDROCHLORIDE 1 MG: 1 CAPSULE ORAL at 20:58

## 2023-01-30 RX ADMIN — ARFORMOTEROL TARTRATE 15 MCG: 15 SOLUTION RESPIRATORY (INHALATION) at 07:51

## 2023-01-30 RX ADMIN — METOPROLOL TARTRATE 12.5 MG: 25 TABLET, FILM COATED ORAL at 20:58

## 2023-01-30 RX ADMIN — PANTOPRAZOLE SODIUM 40 MG: 40 TABLET, DELAYED RELEASE ORAL at 05:09

## 2023-01-30 RX ADMIN — ROSUVASTATIN CALCIUM 10 MG: 10 TABLET, FILM COATED ORAL at 10:17

## 2023-01-30 RX ADMIN — BUDESONIDE 0.5 MG: 0.5 SUSPENSION RESPIRATORY (INHALATION) at 19:38

## 2023-01-30 RX ADMIN — BACITRACIN ZINC NEOMYCIN SULFATE POLYMYXIN B SULFATE 1 APPLICATION: 400; 3.5; 5 OINTMENT TOPICAL at 20:59

## 2023-01-30 RX ADMIN — SERTRALINE 50 MG: 50 TABLET, FILM COATED ORAL at 10:17

## 2023-01-30 RX ADMIN — BACITRACIN ZINC NEOMYCIN SULFATE POLYMYXIN B SULFATE 1 APPLICATION: 400; 3.5; 5 OINTMENT TOPICAL at 10:17

## 2023-01-30 RX ADMIN — REVEFENACIN 175 MCG: 175 SOLUTION RESPIRATORY (INHALATION) at 08:10

## 2023-01-30 RX ADMIN — DOCUSATE SODIUM 100 MG: 100 CAPSULE ORAL at 10:17

## 2023-01-30 RX ADMIN — DOCUSATE SODIUM 100 MG: 100 CAPSULE ORAL at 20:58

## 2023-01-30 RX ADMIN — ASPIRIN 81 MG: 81 TABLET, COATED ORAL at 10:17

## 2023-01-30 RX ADMIN — APIXABAN 2.5 MG: 2.5 TABLET, FILM COATED ORAL at 10:17

## 2023-01-30 RX ADMIN — APIXABAN 2.5 MG: 2.5 TABLET, FILM COATED ORAL at 20:58

## 2023-01-31 PROCEDURE — 97530 THERAPEUTIC ACTIVITIES: CPT

## 2023-01-31 PROCEDURE — 94799 UNLISTED PULMONARY SVC/PX: CPT

## 2023-01-31 PROCEDURE — 97110 THERAPEUTIC EXERCISES: CPT

## 2023-01-31 PROCEDURE — 97535 SELF CARE MNGMENT TRAINING: CPT

## 2023-01-31 PROCEDURE — 63710000001 REVEFENACIN 175 MCG/3ML SOLUTION: Performed by: FAMILY MEDICINE

## 2023-01-31 PROCEDURE — 99231 SBSQ HOSP IP/OBS SF/LOW 25: CPT | Performed by: INTERNAL MEDICINE

## 2023-01-31 PROCEDURE — 97116 GAIT TRAINING THERAPY: CPT

## 2023-01-31 RX ADMIN — APIXABAN 2.5 MG: 2.5 TABLET, FILM COATED ORAL at 09:27

## 2023-01-31 RX ADMIN — DOCUSATE SODIUM 100 MG: 100 CAPSULE ORAL at 09:26

## 2023-01-31 RX ADMIN — APIXABAN 2.5 MG: 2.5 TABLET, FILM COATED ORAL at 20:50

## 2023-01-31 RX ADMIN — BUDESONIDE 0.5 MG: 0.5 SUSPENSION RESPIRATORY (INHALATION) at 08:04

## 2023-01-31 RX ADMIN — ASPIRIN 81 MG: 81 TABLET, COATED ORAL at 09:27

## 2023-01-31 RX ADMIN — TERAZOSIN HYDROCHLORIDE 1 MG: 1 CAPSULE ORAL at 20:50

## 2023-01-31 RX ADMIN — DOCUSATE SODIUM 100 MG: 100 CAPSULE ORAL at 20:50

## 2023-01-31 RX ADMIN — SERTRALINE 50 MG: 50 TABLET, FILM COATED ORAL at 09:26

## 2023-01-31 RX ADMIN — ACETAMINOPHEN 325 MG: 325 TABLET ORAL at 20:49

## 2023-01-31 RX ADMIN — PANTOPRAZOLE SODIUM 40 MG: 40 TABLET, DELAYED RELEASE ORAL at 05:05

## 2023-01-31 RX ADMIN — DIPHENHYDRAMINE HYDROCHLORIDE 12.5 MG: 12.5 SOLUTION ORAL at 20:49

## 2023-01-31 RX ADMIN — METOPROLOL TARTRATE 12.5 MG: 25 TABLET, FILM COATED ORAL at 09:26

## 2023-01-31 RX ADMIN — Medication 1 TABLET: at 09:27

## 2023-01-31 RX ADMIN — ARFORMOTEROL TARTRATE 15 MCG: 15 SOLUTION RESPIRATORY (INHALATION) at 19:21

## 2023-01-31 RX ADMIN — REVEFENACIN 175 MCG: 175 SOLUTION RESPIRATORY (INHALATION) at 08:04

## 2023-01-31 RX ADMIN — BACITRACIN ZINC NEOMYCIN SULFATE POLYMYXIN B SULFATE 1 APPLICATION: 400; 3.5; 5 OINTMENT TOPICAL at 09:34

## 2023-01-31 RX ADMIN — BUDESONIDE 0.5 MG: 0.5 SUSPENSION RESPIRATORY (INHALATION) at 19:21

## 2023-01-31 RX ADMIN — METOPROLOL TARTRATE 12.5 MG: 25 TABLET, FILM COATED ORAL at 20:50

## 2023-01-31 RX ADMIN — ROSUVASTATIN CALCIUM 10 MG: 10 TABLET, FILM COATED ORAL at 09:27

## 2023-01-31 RX ADMIN — BACITRACIN ZINC NEOMYCIN SULFATE POLYMYXIN B SULFATE 1 APPLICATION: 400; 3.5; 5 OINTMENT TOPICAL at 20:50

## 2023-01-31 RX ADMIN — ARFORMOTEROL TARTRATE 15 MCG: 15 SOLUTION RESPIRATORY (INHALATION) at 08:02

## 2023-02-01 LAB
ALBUMIN SERPL-MCNC: 3.4 G/DL (ref 3.5–5.2)
ALBUMIN/GLOB SERPL: 1.3 G/DL
ALP SERPL-CCNC: 123 U/L (ref 39–117)
ALT SERPL W P-5'-P-CCNC: 32 U/L (ref 1–41)
ANION GAP SERPL CALCULATED.3IONS-SCNC: 9.3 MMOL/L (ref 5–15)
AST SERPL-CCNC: 23 U/L (ref 1–40)
BASOPHILS # BLD AUTO: 0.04 10*3/MM3 (ref 0–0.2)
BASOPHILS NFR BLD AUTO: 0.4 % (ref 0–1.5)
BILIRUB SERPL-MCNC: 0.6 MG/DL (ref 0–1.2)
BUN SERPL-MCNC: 33 MG/DL (ref 8–23)
BUN/CREAT SERPL: 23.4 (ref 7–25)
CALCIUM SPEC-SCNC: 8.8 MG/DL (ref 8.2–9.6)
CHLORIDE SERPL-SCNC: 110 MMOL/L (ref 98–107)
CO2 SERPL-SCNC: 21.7 MMOL/L (ref 22–29)
CREAT SERPL-MCNC: 1.41 MG/DL (ref 0.76–1.27)
DEPRECATED RDW RBC AUTO: 50.5 FL (ref 37–54)
EGFRCR SERPLBLD CKD-EPI 2021: 46.2 ML/MIN/1.73
EOSINOPHIL # BLD AUTO: 0.63 10*3/MM3 (ref 0–0.4)
EOSINOPHIL NFR BLD AUTO: 5.8 % (ref 0.3–6.2)
ERYTHROCYTE [DISTWIDTH] IN BLOOD BY AUTOMATED COUNT: 14.2 % (ref 12.3–15.4)
GLOBULIN UR ELPH-MCNC: 2.6 GM/DL
GLUCOSE SERPL-MCNC: 138 MG/DL (ref 65–99)
HCT VFR BLD AUTO: 35.2 % (ref 37.5–51)
HGB BLD-MCNC: 11.2 G/DL (ref 13–17.7)
IMM GRANULOCYTES # BLD AUTO: 0.07 10*3/MM3 (ref 0–0.05)
IMM GRANULOCYTES NFR BLD AUTO: 0.6 % (ref 0–0.5)
LYMPHOCYTES # BLD AUTO: 1.2 10*3/MM3 (ref 0.7–3.1)
LYMPHOCYTES NFR BLD AUTO: 11.1 % (ref 19.6–45.3)
MCH RBC QN AUTO: 31.5 PG (ref 26.6–33)
MCHC RBC AUTO-ENTMCNC: 31.8 G/DL (ref 31.5–35.7)
MCV RBC AUTO: 98.9 FL (ref 79–97)
MONOCYTES # BLD AUTO: 0.67 10*3/MM3 (ref 0.1–0.9)
MONOCYTES NFR BLD AUTO: 6.2 % (ref 5–12)
NEUTROPHILS NFR BLD AUTO: 75.9 % (ref 42.7–76)
NEUTROPHILS NFR BLD AUTO: 8.22 10*3/MM3 (ref 1.7–7)
NRBC BLD AUTO-RTO: 0 /100 WBC (ref 0–0.2)
PLATELET # BLD AUTO: 302 10*3/MM3 (ref 140–450)
PMV BLD AUTO: 9.9 FL (ref 6–12)
POTASSIUM SERPL-SCNC: 4.2 MMOL/L (ref 3.5–5.2)
PROT SERPL-MCNC: 6 G/DL (ref 6–8.5)
RBC # BLD AUTO: 3.56 10*6/MM3 (ref 4.14–5.8)
SODIUM SERPL-SCNC: 141 MMOL/L (ref 136–145)
WBC NRBC COR # BLD: 10.83 10*3/MM3 (ref 3.4–10.8)

## 2023-02-01 PROCEDURE — 97530 THERAPEUTIC ACTIVITIES: CPT

## 2023-02-01 PROCEDURE — 97110 THERAPEUTIC EXERCISES: CPT

## 2023-02-01 PROCEDURE — 94799 UNLISTED PULMONARY SVC/PX: CPT

## 2023-02-01 PROCEDURE — 85025 COMPLETE CBC W/AUTO DIFF WBC: CPT | Performed by: INTERNAL MEDICINE

## 2023-02-01 PROCEDURE — 97535 SELF CARE MNGMENT TRAINING: CPT

## 2023-02-01 PROCEDURE — 80053 COMPREHEN METABOLIC PANEL: CPT | Performed by: INTERNAL MEDICINE

## 2023-02-01 PROCEDURE — 63710000001 REVEFENACIN 175 MCG/3ML SOLUTION: Performed by: FAMILY MEDICINE

## 2023-02-01 PROCEDURE — 97116 GAIT TRAINING THERAPY: CPT

## 2023-02-01 RX ADMIN — REVEFENACIN 175 MCG: 175 SOLUTION RESPIRATORY (INHALATION) at 07:23

## 2023-02-01 RX ADMIN — ARFORMOTEROL TARTRATE 15 MCG: 15 SOLUTION RESPIRATORY (INHALATION) at 20:01

## 2023-02-01 RX ADMIN — METOPROLOL TARTRATE 12.5 MG: 25 TABLET, FILM COATED ORAL at 20:00

## 2023-02-01 RX ADMIN — BACITRACIN ZINC NEOMYCIN SULFATE POLYMYXIN B SULFATE 1 APPLICATION: 400; 3.5; 5 OINTMENT TOPICAL at 08:57

## 2023-02-01 RX ADMIN — BACITRACIN ZINC NEOMYCIN SULFATE POLYMYXIN B SULFATE 1 APPLICATION: 400; 3.5; 5 OINTMENT TOPICAL at 20:01

## 2023-02-01 RX ADMIN — DIPHENHYDRAMINE HYDROCHLORIDE 12.5 MG: 12.5 SOLUTION ORAL at 20:00

## 2023-02-01 RX ADMIN — BUDESONIDE 0.5 MG: 0.5 SUSPENSION RESPIRATORY (INHALATION) at 07:23

## 2023-02-01 RX ADMIN — APIXABAN 2.5 MG: 2.5 TABLET, FILM COATED ORAL at 10:16

## 2023-02-01 RX ADMIN — DOCUSATE SODIUM 100 MG: 100 CAPSULE ORAL at 20:00

## 2023-02-01 RX ADMIN — ACETAMINOPHEN 325 MG: 325 TABLET ORAL at 20:00

## 2023-02-01 RX ADMIN — APIXABAN 2.5 MG: 2.5 TABLET, FILM COATED ORAL at 20:00

## 2023-02-01 RX ADMIN — BUDESONIDE 0.5 MG: 0.5 SUSPENSION RESPIRATORY (INHALATION) at 20:01

## 2023-02-01 RX ADMIN — SERTRALINE 50 MG: 50 TABLET, FILM COATED ORAL at 08:48

## 2023-02-01 RX ADMIN — Medication 1 TABLET: at 08:48

## 2023-02-01 RX ADMIN — ROSUVASTATIN CALCIUM 10 MG: 10 TABLET, FILM COATED ORAL at 08:48

## 2023-02-01 RX ADMIN — METOPROLOL TARTRATE 12.5 MG: 25 TABLET, FILM COATED ORAL at 08:48

## 2023-02-01 RX ADMIN — ASPIRIN 81 MG: 81 TABLET, COATED ORAL at 08:48

## 2023-02-01 RX ADMIN — PANTOPRAZOLE SODIUM 40 MG: 40 TABLET, DELAYED RELEASE ORAL at 05:07

## 2023-02-01 RX ADMIN — DOCUSATE SODIUM 100 MG: 100 CAPSULE ORAL at 08:48

## 2023-02-01 RX ADMIN — TERAZOSIN HYDROCHLORIDE 1 MG: 1 CAPSULE ORAL at 20:01

## 2023-02-01 RX ADMIN — ARFORMOTEROL TARTRATE 15 MCG: 15 SOLUTION RESPIRATORY (INHALATION) at 07:23

## 2023-02-01 NOTE — PROGRESS NOTES
Patient states he is doing very well, he slept well, he is participating with therapy.  He has laceration on his forehead is almost completely healed.  No new complaints no new events overnight.  129/65, 18, 63, 98.  He is 100% saturated on room air.  Patient walked 320 feet x 2 with no assistive device contact-guard assist.  He did have some imbalance but self corrected.  Standing exercises completed for balance and endurance.  Patient is mostly minimal assist to set up for ADLs.  Progressing well towards goals.  He does have chronic kidney disease, creatinine is stable.

## 2023-02-01 NOTE — PROGRESS NOTES
Occupational Therapy:    Physical Therapy: Individual: 100 minutes.    Speech Language Pathology:    Signed by: Kimberly Graves PTA

## 2023-02-01 NOTE — PROGRESS NOTES
Occupational Therapy: Individual: 100 minutes.    Physical Therapy:    Speech Language Pathology:    Signed by: Padmaja Cordova, Occupational Therapist

## 2023-02-01 NOTE — THERAPY TREATMENT NOTE
Inpatient Rehabilitation - Physical Therapy Progress Note        [facility] [place]     Patient Name: Jose Penn  : 1928  MRN: [id number]    Today's Date: 2023                    Admit Date: 2023      Visit Dx:   No diagnosis found.    Patient Active Problem List   Diagnosis   • Carotid stenosis   • Headache   • Arthritis   • Stroke (HCC)   • Multiple trauma       Past Medical History:   Diagnosis Date   • Arthritis    • Carotid stenosis    • Headache    • Hypertension    • Stroke (HCC)        Past Surgical History:   Procedure Laterality Date   • CHOLECYSTECTOMY     • PROSTATE SURGERY         PT ASSESSMENT (last 12 hours)     IRF PT Evaluation and Treatment     Row Name 2321          PT Time and Intention    Document Type progress note;daily treatment  -HC     Mode of Treatment individual therapy;physical therapy  -HC     Patient/Family/Caregiver Comments/Observations Pt and RN in agreement for PT. Pt completed sitting exercises with increased Wt on this date for strengthening. Pt walked 320' x 2 with no AD CGA, Pt is wobbly at times but can self correct when not distracted. Standing exercises completed for balance and endurance, added FOAM mat toward end to challenge Pt. LE bike x 8' on L.V 1.8  -HC     Row Name 23 0921          General Information    Existing Precautions/Restrictions fall  -HC     Row Name 23 09          Pain Scale: FACES Pre/Post-Treatment    Pain: FACES Scale, Pretreatment 0-->no hurt  -HC     Posttreatment Pain Rating 0-->no hurt  -HC     Row Name 23 0921          Cognition/Psychosocial    Affect/Mental Status (Cognition) WFL  -HC     Orientation Status (Cognition) oriented x 3  -HC     Follows Commands (Cognition) verbal cues/prompting required;physical/tactile prompts required;repetition of directions required  -     Personal Safety Interventions fall prevention program maintained;gait belt;nonskid shoes/slippers when out of bed;supervised  activity  -     Row Name 02/01/23 0921          Bed Mobility    Supine-Sit [place] (Bed Mobility) verbal cues;nonverbal cues (demo/gesture);modified independence;standby assist  -     Row Name 02/01/23 0921          Transfer Assessment/Treatment    Transfers toilet transfer;car transfer  -     Row Name 02/01/23 0921          Bed-Chair Transfer    Bed-Chair [place] (Transfers) contact guard;nonverbal cues (demo/gesture);verbal cues;standby assist  -     Assistive Device (Bed-Chair Transfers) wheelchair  -     Row Name 02/01/23 0921          Sit-Stand Transfer    Sit-Stand [place] (Transfers) verbal cues;nonverbal cues (demo/gesture);contact guard;standby assist  -     Assistive Device (Sit-Stand Transfers) walker, front-wheeled;parallel bars  [facility]     Row Name 02/01/23 0921          Stand-Sit Transfer    Stand-Sit [place] (Transfers) verbal cues;nonverbal cues (demo/gesture);contact guard;standby assist  -     Assistive Device (Stand-Sit Transfers) walker, front-wheeled;parallel bars  [facility]     Row Name 02/01/23 0921          Stand Pivot/Stand Step Transfer    Stand Pivot/Stand Step [place] (Transfers) contact guard;verbal cues;nonverbal cues (demo/gesture);standby assist  -     Assistive Device (Stand Pivot Stand Step Transfer) walker, front-wheeled  -     Row Name 02/01/23 0921          Toilet Transfer    Type (Toilet Transfer) stand pivot/stand step  -HC     [place] Level (Toilet Transfer) contact guard;standby assist;nonverbal cues (demo/gesture);verbal cues  -     Assistive Device (Toilet Transfer) grab bars/safety frame;commode chair;commode  -     Row Name 02/01/23 0921          Gait/Stairs (Locomotion)    [place] Level (Gait) verbal cues;nonverbal cues (demo/gesture);contact guard  -     Assistive Device (Gait) other (see comments)  no AD  -HC     Distance in Feet (Gait) 320' x 2  -HC     Pattern (Gait) step-through  -HC     Deviations/Abnormal Patterns  (Gait) claudia decreased;gait speed decreased;stride length decreased  inconsistent gait speed and step length, may be connected to attention  -     Bilateral Gait Deviations forward flexed posture  verbal cues to steer walker, he looks at the floor-repeated cues needed  -     Row Name 23          Safety Issues, Functional Mobility    Impairments Affecting Function (Mobility) balance;cognition;endurance/activity tolerance;strength  -     Row Name 23          Balance    Comment, Balance Sittin.5#: AP, LAQ, March. Ball sq, GTB: HS curls, hip abd, march. // bars: March, hip abd, mini squats, HS curls, step up and down on foam, calf raises on foam. Sitting bean bag toss  -     Row Name 23          Hip (Therapeutic Exercise)    Hip Strengthening (Therapeutic Exercise) bilateral;flexion;extension;aBduction;aDduction;marching while seated;marching while standing;mini squats;sitting;standing;resistance band;green  2.5#  -     Row Name 23          Knee (Therapeutic Exercise)    Knee Strengthening (Therapeutic Exercise) bilateral;flexion;extension;marching while seated;marching while standing;LAQ (long arc quad);hamstring curls;sitting;standing;resistance band;green  2.5#  -     Row Name 23          Ankle (Therapeutic Exercise)    Ankle Strengthening (Therapeutic Exercise) bilateral;dorsiflexion;plantarflexion;sitting;standing  2.5#  -     Row Name 23          Positioning and Restraints    Pre-Treatment Position in bed  -     Post Treatment Position wheelchair  -     In Wheelchair with OT  -     Row Name 23          Therapy Assessment/Plan (PT)    Patient's Goals For Discharge return home  -     Row Name 23          Therapy Assessment/Plan (PT)    Rehab Potential/Prognosis (PT) adequate, monitor progress closely  -     Frequency of Treatment (PT) 5 times per week  -     Estimated Duration of Therapy (PT) 2  weeks  -     Problem List (PT) balance;cognition;mobility;strength  -     Activity Limitations Related to Problem List (PT) unable to ambulate safely;unable to transfer safely  -     Row Name 02/01/23 0921          Daily Progress Summary (PT)    Impairments Still Limiting Function (PT) functional activity tolerance impairment;strength deficit  -     Row Name 02/01/23 0921          Therapy Plan Review/Discharge Plan (PT)    Anticipated Equipment Needs at Discharge (PT Eval) --  tbd  -     Anticipated Discharge Disposition (PT) home with assist;home with home health;home with outpatient therapy services  -     Row Name 02/01/23 0921          IRF PT Goals    Bed Mobility Goal Selection (PT-IRF) bed mobility, PT goal 1  -     Transfer Goal Selection (PT-IRF) transfers, PT goal 1  -     Gait (Walking Locomotion) Goal Selection (PT-IRF) gait, PT goal 1  -     Row Name 02/01/23 0921          Bed Mobility Goal 1 (PT-IRF)    Activity/Assistive Device (Bed Mobility Goal 1, PT-IRF) sit to supine/supine to sit  -     [place] Level (Bed Mobility Goal 1, PT-IRF) modified independence  -     Time Frame (Bed Mobility Goal 1, PT-IRF) by discharge  -     Progress/Outcomes (Bed Mobility Goal 1, PT-IRF) goal ongoing  -     Row Name 02/01/23 0921          Transfer Goal 1 (PT-IRF)    Activity/Assistive Device (Transfer Goal 1, PT-IRF) sit-to-stand/stand-to-sit;bed-to-chair/chair-to-bed  -     [place] Level (Transfer Goal 1, PT-IRF) supervision required  -     Time Frame (Transfer Goal 1, PT-IRF) by discharge  -HC     Progress/Outcomes (Transfer Goal 1, PT-IRF) goal partially met;goal ongoing  -     Row Name 02/01/23 0921          Gait/Walking Locomotion Goal 1 (PT-IRF)    Activity/Assistive Device (Gait/Walking Locomotion Goal 1, PT-IRF) walker, rolling  -     Gait/Walking Locomotion Distance Goal 1 (PT-IRF) 300'  -HC     [place] Level (Gait/Walking Locomotion Goal 1, PT-IRF) supervision  required  -HC     Time Frame (Gait/Walking Locomotion Goal 1, PT-IRF) by discharge  -HC     Progress/Outcomes (Gait/Walking Locomotion Goal 1, PT-IRF) goal ongoing;goal partially met  -HC           User Key  (r) = Recorded By, (t) = Taken By, (c) = Cosigned By    Initials Name Provider Type     Kimberly Graves PTA Physical Therapist Assistant              Wound 01/24/23 1341 Left scalp Laceration (Active)   Closure Approximated 01/31/23 2015   Base dry;clean 01/31/23 2015       Wound 01/24/23 1347 Left posterior middle finger Laceration (Active)   Base moist;pink;red;white 01/31/23 2015   Care, Wound antimicrobial agent applied 01/31/23 2015   Dressing Care dressing changed 01/31/23 2015       Wound Left proximal arm Skin Tear (Active)   Dressing Appearance dry;intact 01/31/23 2015       Wound 01/24/23 1352 Left anterior thigh Abrasion (Active)   Dressing Appearance dry;intact 01/31/23 2015   Dressing Care dressing changed 01/31/23 2015     Physical Therapy Education     Title: PT OT SLP Therapies (Done)     Topic: Physical Therapy (Done)     Point: Mobility training (Done)     Learning Progress Summary           Patient Acceptance, E,TB, DU,VU by  at 2/1/2023 0931    Acceptance, E,TB, VU,DU by  at 1/31/2023 1449    Acceptance, E,TB, VU,DU by  at 1/30/2023 1437    Acceptance, E,TB, VU by DG at 1/28/2023 1937    Acceptance, E,TB, VU by RF at 1/28/2023 1522    Acceptance, E,TB, VU,DU by  at 1/27/2023 1459    Acceptance, E,TB, VU by DG at 1/26/2023 2001    Acceptance, E,D, VU,NR by LL at 1/26/2023 1551    Acceptance, E,TB,D, VU,NR,DU by  at 1/26/2023 1507    Acceptance, E, VU,NR by LB at 1/25/2023 1501                   Point: Home exercise program (Done)     Learning Progress Summary           Patient Acceptance, E,TB, DU,VU by  at 2/1/2023 0931    Acceptance, E,TB, VU,DU by  at 1/31/2023 1449    Acceptance, E,TB, VU,DU by HC at 1/30/2023 1437    Acceptance, E,TB, VU by DG at 1/28/2023 1937     Acceptance, E,TB, VU by RF at 1/28/2023 1522    Acceptance, E,TB, VU,DU by HC at 1/27/2023 1459    Acceptance, E,TB, VU by DG at 1/26/2023 2001    Acceptance, E,D, VU,NR by LL at 1/26/2023 1551    Acceptance, E,TB,D, VU,NR,DU by HC at 1/26/2023 1507    Acceptance, E, VU,NR by LB at 1/25/2023 1501                   Point: Body mechanics (Done)     Learning Progress Summary           Patient Acceptance, E,TB, DU,VU by HC at 2/1/2023 0931    Acceptance, E,TB, VU,DU by HC at 1/31/2023 1449    Acceptance, E,TB, VU,DU by HC at 1/30/2023 1437    Acceptance, E,TB, VU by DG at 1/28/2023 1937    Acceptance, E,TB, VU by RF at 1/28/2023 1522    Acceptance, E,TB, VU,DU by HC at 1/27/2023 1459    Acceptance, E,TB, VU by DG at 1/26/2023 2001    Acceptance, E,D, VU,NR by LL at 1/26/2023 1551    Acceptance, E,TB,D, VU,NR,DU by HC at 1/26/2023 1507    Acceptance, E, VU,NR by LB at 1/25/2023 1501                   Point: Precautions (Done)     Learning Progress Summary           Patient Acceptance, E,TB, DU,VU by HC at 2/1/2023 0931    Acceptance, E,TB, VU,DU by HC at 1/31/2023 1449    Acceptance, E,TB, VU,DU by HC at 1/30/2023 1437    Acceptance, E,TB, VU by DG at 1/28/2023 1937    Acceptance, E,TB, VU by RF at 1/28/2023 1522    Acceptance, E,TB, VU,DU by HC at 1/27/2023 1459    Acceptance, E,TB, VU by DG at 1/26/2023 2001    Acceptance, E,D, VU,NR by LL at 1/26/2023 1551    Acceptance, E,TB,D, VU,NR,DU by HC at 1/26/2023 1507    Acceptance, E, VU,NR by LB at 1/25/2023 1501                               User Key     Initials Effective Dates Name Provider Type Discipline     06/16/21 -  Chantal Gamez, RN Registered Nurse Nurse    LB 06/16/21 -  Sole Vasquez, PT Physical Therapist PT    LL 05/02/16 -  Jewell Kessler, PTA Physical Therapist Assistant PT    RF 06/16/21 -  Sheila Plummer PTA Physical Therapist Assistant PT    HC 01/20/23 -  Kimberly Graves, PTA Physical Therapist Assistant PT                PT  Recommendation and Plan    Frequency of Treatment (PT): 5 times per week  Anticipated Equipment Needs at Discharge (PT Eval):  (tbd)  Daily Progress Summary (PT)  Impairments Still Limiting Function (PT): functional activity tolerance impairment, strength deficit               Time Calculation:      PT Charges     Row Name 02/01/23 0931             Time Calculation    Start Time 0735  -HC      Stop Time 0915  -HC      Time Calculation (min) 100 min  -HC      PT Received On 02/01/23  -HC         Time Calculation- PT    Total Timed Code Minutes-  minute(s)  -HC            User Key  (r) = Recorded By, (t) = Taken By, (c) = Cosigned By    Initials Name Provider Type     Kimberly Graves, PTA Physical Therapist Assistant                Therapy Charges for Today     Code Description Service Date Service Provider Modifiers Qty    97103278212 HC GAIT TRAINING EA 15 MIN 1/31/2023 Kimberly Graves PTA GP, CQ 1    64411863091 HC PT THER PROC EA 15 MIN 1/31/2023 Kimberly Graves PTA GP, CQ 3    16374735064 HC PT THERAPEUTIC ACT EA 15 MIN 1/31/2023 Kimberly Graves PTA GP, CQ 2    85270134258 HC GAIT TRAINING EA 15 MIN 2/1/2023 Kimberly Graves PTA GP, CQ 1    36320600489 HC PT THER PROC EA 15 MIN 2/1/2023 Kimberly Graves PTA GP, CQ 3    76707452974 HC PT THERAPEUTIC ACT EA 15 MIN 2/1/2023 Kimberly Graves PTA GP, CQ 3                   Kimberly Graves PTA  2/1/2023

## 2023-02-01 NOTE — SIGNIFICANT NOTE
02/01/23 1210   Plan   Plan SS spoke to pt about how he is doing in therapy and plans for discharge on 2-8-23.  Pt requests to go home sooner and wants SS to ask MD.  Spoke to daughter Chantal [phone] about how pt is doing in therapy and pt requesting to go home sooner.  Daughter has two ladies to stay with pt and one of those ladies wants to stay at night.  Daughter wants to know if pt could be discharged on 2-3-23.  MD is agreeable to discharge pt on 2-3-23.  Daughter will come to rehab around 11:00 am for discharge.   Patient/Family in Agreement with Plan yes

## 2023-02-01 NOTE — THERAPY TREATMENT NOTE
Inpatient Rehabilitation - Occupational Therapy Progress Note and Treatment Note     Jean [place]     Patient Name: Jose Penn  : 1928  MRN: [id number]    Today's Date: 2023                 Admit Date: 2023       No diagnosis found.    Patient Active Problem List   Diagnosis   • Carotid stenosis   • Headache   • Arthritis   • Stroke (HCC)   • Multiple trauma       Past Medical History:   Diagnosis Date   • Arthritis    • Carotid stenosis    • Headache    • Hypertension    • Stroke (HCC)        Past Surgical History:   Procedure Laterality Date   • CHOLECYSTECTOMY     • PROSTATE SURGERY               IRF OT ASSESSMENT FLOWSHEET (last 12 hours)     IRF OT Evaluation and Treatment     Row Name 23 1451          OT Time and Intention    Document Type progress note;daily treatment  -     Mode of Treatment occupational therapy  -     Symptoms Noted During/After Treatment none  -     Row Name 23 1451          General Information    Patient/Family/Caregiver Comments/Observations agreeable to therapy  -     Existing Precautions/Restrictions fall;lifting  no lifting greater than 10 lbs  -     Row Name 23 1451          Cognition/Psychosocial    Follows Commands (Cognition) verbal cues/prompting required;repetition of directions required  -     Attention Deficit (Cognition) requires cues/redirection to task  -     Safety Deficit (Cognition) safety precautions awareness  -     Row Name 23 1451          Bathing    Comment (Bathing) [place]  -     Row Name 23 1451          Upper Body Dressing    [place] Level (Upper Body Dressing) pull over garment  -     Comment (Upper Body Dressing) Set up/ supervision  -     Row Name 23 1451          Lower Body Dressing    Assistive Device Use (Lower Body Dressing) reacher;sock-aid  -     Comment (Lower Body Dressing) [place]  -     Row Name 23 1451          Grooming    Comment (Grooming) Set up/ supervision   -     Row Name 02/01/23 1451          Toileting    Assistive Device Use (Toileting) grab bar/safety frame;raised toilet seat  -     Comment (Toileting) [place]  -     Row Name 02/01/23 1451          Bed-Chair Transfer    Bed-Chair [place] (Transfers) contact guard;verbal cues  -     Assistive Device (Bed-Chair Transfers) wheelchair  -     Row Name 02/01/23 1451          Chair-Bed Transfer    Chair-Bed [place] (Transfers) contact guard;verbal cues  -     Assistive Device (Chair-Bed Transfers) wheelchair  -     Row Name 02/01/23 1451          Toilet Transfer    [place] Level (Toilet Transfer) contact guard;verbal cues  -     Assistive Device (Toilet Transfer) grab bars/safety frame;raised toilet seat  -     Row Name 02/01/23 1451          Motor Skills    Motor Skills functional endurance  -     Motor Control/Coordination Interventions therapeutic exercise/ROM;gross motor coordination activities  BUE ther ex/act, AROM, bilat coord ex  -     Therapeutic Exercise --  bilat vianey, dowel wrist rolls, hand exerciser  -     Row Name 02/01/23 1451          Positioning and Restraints    Post Treatment Position bed  -     In Bed call light within reach;encouraged to call for assist;exit alarm on;notified nsg;heels elevated  in pm  -     In Wheelchair sitting;with nsg;exit alarm on;encouraged to call for assist;patient within staff view  am  -     Row Name 02/01/23 1451          LB Dressing Goal 1 (OT-IRF)    [place] (LB Dressing Goal 1, OT-IRF) moderate assist (50-74% patient effort)  -     Progress/Outcomes (LB Dressing Goal 1, OT-IRF) goal met  -     Row Name 02/01/23 1512 02/01/23 1451       Toileting Goal 1 (OT-IRF)    [place] Level (Toileting Goal 1, OT-IRF) contact guard required;minimum assist (75% or more patient effort)  - moderate assist (50-74% patient effort)  -    Progress/Outcomes (Toileting Goal 1, OT-IRF) new goal  - goal met  -    Time Frame  (Toileting Goal 1, OT-IRF) short-term goal (STG)  - --          User Key  (r) = Recorded By, (t) = Taken By, (c) = Cosigned By    Initials Name Effective Dates    Padmaja Singh OT 06/16/21 -                  Occupational Therapy Education     Title: PT OT SLP Therapies (Done)     Topic: Occupational Therapy (Done)     Point: ADL training (Done)     Description:   Instruct learner(s) on proper safety adaptation and remediation techniques during self care or transfers.   Instruct in proper use of assistive devices.              Learning Progress Summary           Patient Acceptance, E,D, VU,NR by  at 2/1/2023 1507    Acceptance, E,D, VU,NR by  at 1/31/2023 1430    Acceptance, E,D, VU,NR by  at 1/30/2023 1412    Acceptance, E,TB, VU by  at 1/28/2023 1937    Acceptance, E, VU,NR by  at 1/28/2023 1142    Acceptance, E,D, VU,NR by  at 1/27/2023 1321    Acceptance, E,TB, VU by KEENAN at 1/26/2023 2001    Acceptance, E,D, VU,NR by  at 1/26/2023 1503    Acceptance, E,D, VU,NR by  at 1/25/2023 1523                   Point: Precautions (Done)     Description:   Instruct learner(s) on prescribed precautions during self-care and functional transfers.              Learning Progress Summary           Patient Acceptance, E,D, VU,NR by  at 2/1/2023 1507    Acceptance, E,D, VU,NR by  at 1/31/2023 1430    Acceptance, E,D, VU,NR by  at 1/30/2023 1412    Acceptance, E,TB, VU by KEENAN at 1/28/2023 1937    Acceptance, E, VU,NR by HB at 1/28/2023 1142    Acceptance, E,D, VU,NR by  at 1/27/2023 1321    Acceptance, E,TB, VU by KEENAN at 1/26/2023 2001    Acceptance, E,D, VU,NR by  at 1/26/2023 1503    Acceptance, E,D, VU,NR by  at 1/25/2023 1523                               User Key     Initials Effective Dates Name Provider Type Discipline    DG 06/16/21 -  Chantal Gamez, RN Registered Nurse Nurse     06/16/21 -  Padmaja Cordova OT Occupational Therapist OT     05/25/21 -  Meme Arthur OT Occupational  Therapist OT                    OT Recommendation and Plan    Planned Therapy Interventions (OT): activity tolerance training, adaptive equipment training, BADL retraining, occupation/activity based interventions, patient/caregiver education/training, ROM/therapeutic exercise, strengthening exercise, transfer/mobility retraining                    Time Calculation:      Time Calculation- OT     Row Name 02/01/23 1507 02/01/23 1506          Time Calculation- OT    OT Start Time 1245  - 0915  -     OT Stop Time 1340  - 1000  -     OT Time Calculation (min) 55 min  - 45 min  -     Total Timed Code Minutes- OT 55 minute(s)  - 45 minute(s)  -           User Key  (r) = Recorded By, (t) = Taken By, (c) = Cosigned By    Initials Name Provider Type     Padmaja Cordova OT Occupational Therapist              Therapy Charges for Today     Code Description Service Date Service Provider Modifiers Qty    18494676527 HC OT SELF CARE/MGMT/TRAIN EA 15 MIN 1/31/2023 Padmaja Cordova OT GO 4    20202189793 HC OT THERAPEUTIC ACT EA 15 MIN 1/31/2023 Padmaja Cordova OT GO 2    81952870993 HC OT THER PROC EA 15 MIN 1/31/2023 Padmaja Cordova OT GO 1    52643042231 HC OT SELF CARE/MGMT/TRAIN EA 15 MIN 2/1/2023 Padmaja Cordova OT GO 2    66352007537 HC OT THER PROC EA 15 MIN 2/1/2023 Padmaja Cordova OT GO 3    05387611142 HC OT THERAPEUTIC ACT EA 15 MIN 2/1/2023 Padmaja Cordvoa OT GO 2                   Padmaja Cordova OT  2/1/2023

## 2023-02-01 NOTE — PLAN OF CARE
Goal Outcome Evaluation:   Progressing medically and physically with all therapies.  Continue current POC.

## 2023-02-01 NOTE — SIGNIFICANT NOTE
02/01/23 1400   Plan   Plan Spoke to pt about discharge on 2-3-23 and recommendation for home health therapy.  Pt declines needing home health services and prefers to do a home exercise program provided by PT/OT.  Pt says he has exercise equipment at home.  Pt has a rolling walker and elevated toilet seat at home.  PT/OT agreeable to provide home exercise programs at discharge.   Patient/Family in Agreement with Plan yes

## 2023-02-01 NOTE — PLAN OF CARE
Goal Outcome Evaluation:  Plan of Care Reviewed With: patient        Progress: improving  Outcome Evaluation: VSS. WOUND DRESSINGS CHANGED. PT RESTING IN BED WITH NO COMPLAINTS. WILL CONTINUE TO MONITOR.

## 2023-02-01 NOTE — PROGRESS NOTES
Rehabilitation Nursing  Inpatient Rehabilitation Plan of Care Note    Plan of Care  Copy from POCSafety    Potential for Injury (Active)  Current Status (1/24/2023 2:00:00 PM): RISK FOR FALLS  Weekly Goal: NO FALLS  Discharge Goal: NO FALLS    Body Systems    Integumentary (Active)  Current Status (1/24/2023 2:00:00 PM): RISK FOR SKIN BREAKDOWN  Weekly Goal: NO BREAKDOWN  Discharge Goal: NO BREAKDOWN    Signed by: Chantal Gamez, Nurse

## 2023-02-02 PROCEDURE — 97116 GAIT TRAINING THERAPY: CPT

## 2023-02-02 PROCEDURE — 94799 UNLISTED PULMONARY SVC/PX: CPT

## 2023-02-02 PROCEDURE — 99231 SBSQ HOSP IP/OBS SF/LOW 25: CPT | Performed by: INTERNAL MEDICINE

## 2023-02-02 PROCEDURE — 97112 NEUROMUSCULAR REEDUCATION: CPT

## 2023-02-02 PROCEDURE — 97110 THERAPEUTIC EXERCISES: CPT

## 2023-02-02 PROCEDURE — 97530 THERAPEUTIC ACTIVITIES: CPT

## 2023-02-02 PROCEDURE — 97535 SELF CARE MNGMENT TRAINING: CPT

## 2023-02-02 PROCEDURE — 94761 N-INVAS EAR/PLS OXIMETRY MLT: CPT

## 2023-02-02 PROCEDURE — 63710000001 REVEFENACIN 175 MCG/3ML SOLUTION: Performed by: FAMILY MEDICINE

## 2023-02-02 RX ADMIN — ARFORMOTEROL TARTRATE 15 MCG: 15 SOLUTION RESPIRATORY (INHALATION) at 20:29

## 2023-02-02 RX ADMIN — ASPIRIN 81 MG: 81 TABLET, COATED ORAL at 10:22

## 2023-02-02 RX ADMIN — ARFORMOTEROL TARTRATE 15 MCG: 15 SOLUTION RESPIRATORY (INHALATION) at 07:08

## 2023-02-02 RX ADMIN — ACETAMINOPHEN 325 MG: 325 TABLET ORAL at 20:00

## 2023-02-02 RX ADMIN — DOCUSATE SODIUM 100 MG: 100 CAPSULE ORAL at 10:22

## 2023-02-02 RX ADMIN — BUDESONIDE 0.5 MG: 0.5 SUSPENSION RESPIRATORY (INHALATION) at 07:08

## 2023-02-02 RX ADMIN — REVEFENACIN 175 MCG: 175 SOLUTION RESPIRATORY (INHALATION) at 07:10

## 2023-02-02 RX ADMIN — APIXABAN 2.5 MG: 2.5 TABLET, FILM COATED ORAL at 10:22

## 2023-02-02 RX ADMIN — DIPHENHYDRAMINE HYDROCHLORIDE 12.5 MG: 12.5 SOLUTION ORAL at 20:00

## 2023-02-02 RX ADMIN — Medication 1 TABLET: at 10:22

## 2023-02-02 RX ADMIN — DOCUSATE SODIUM 100 MG: 100 CAPSULE ORAL at 20:00

## 2023-02-02 RX ADMIN — BUDESONIDE 0.5 MG: 0.5 SUSPENSION RESPIRATORY (INHALATION) at 20:29

## 2023-02-02 RX ADMIN — APIXABAN 2.5 MG: 2.5 TABLET, FILM COATED ORAL at 20:00

## 2023-02-02 RX ADMIN — PANTOPRAZOLE SODIUM 40 MG: 40 TABLET, DELAYED RELEASE ORAL at 05:01

## 2023-02-02 RX ADMIN — BACITRACIN ZINC NEOMYCIN SULFATE POLYMYXIN B SULFATE 1 APPLICATION: 400; 3.5; 5 OINTMENT TOPICAL at 20:01

## 2023-02-02 RX ADMIN — SERTRALINE 50 MG: 50 TABLET, FILM COATED ORAL at 10:22

## 2023-02-02 RX ADMIN — TERAZOSIN HYDROCHLORIDE 1 MG: 1 CAPSULE ORAL at 20:00

## 2023-02-02 RX ADMIN — ROSUVASTATIN CALCIUM 10 MG: 10 TABLET, FILM COATED ORAL at 10:22

## 2023-02-02 RX ADMIN — METOPROLOL TARTRATE 12.5 MG: 25 TABLET, FILM COATED ORAL at 10:22

## 2023-02-02 RX ADMIN — BACITRACIN ZINC NEOMYCIN SULFATE POLYMYXIN B SULFATE 1 APPLICATION: 400; 3.5; 5 OINTMENT TOPICAL at 09:32

## 2023-02-02 NOTE — PROGRESS NOTES
Occupational Therapy: Individual: 115 minutes.    Physical Therapy:    Speech Language Pathology:    Signed by: Padmaja Cordova, Occupational Therapist
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
## 2023-02-02 NOTE — PROGRESS NOTES
Rehabilitation Nursing  Inpatient Rehabilitation Plan of Care Note    Plan of Care  Safety    Potential for Injury (Active)  Current Status (1/24/2023 2:00:00 PM): RISK FOR FALLS  Weekly Goal: NO FALLS  Discharge Goal: NO FALLS    Body Systems    Integumentary (Active)  Current Status (1/24/2023 2:00:00 PM): RISK FOR SKIN BREAKDOWN  Weekly Goal: NO BREAKDOWN  Discharge Goal: NO BREAKDOWN    Signed by: Esteban Garcia RN

## 2023-02-02 NOTE — PLAN OF CARE
Goal Outcome Evaluation:  Plan of Care Reviewed With: patient        Progress: no change  Outcome Evaluation: Pt resting, worked with PT, no changes, will continue to monitor

## 2023-02-02 NOTE — THERAPY PROGRESS REPORT/RE-CERT
Inpatient Rehabilitation - Physical Therapy Treatment Note       HERMES Reaves     Patient Name: Jose Penn  : 1928  MRN: [id number]    Today's Date: 2023                    Admit Date: 2023      Visit Dx:   No diagnosis found.    Patient Active Problem List   Diagnosis   • Carotid stenosis   • Headache   • Arthritis   • Stroke (HCC)   • Multiple trauma       Past Medical History:   Diagnosis Date   • Arthritis    • Carotid stenosis    • Headache    • Hypertension    • Stroke (HCC)        Past Surgical History:   Procedure Laterality Date   • CHOLECYSTECTOMY     • PROSTATE SURGERY         PT ASSESSMENT (last 12 hours)     IRF PT Evaluation and Treatment     Row Name 23 1503          PT Time and Intention    Document Type progress note  -LL     Mode of Treatment individual therapy;physical therapy  -LL     Patient/Family/Caregiver Comments/Observations Patient had  no new c/o this date and was agreeable to PT session.  Patient has made significant progress with therapy requiring less assistance with functional mobility.  -LL     Row Name 23 1503          General Information    Existing Precautions/Restrictions fall  -LL     Row Name 23 1503          Pain Scale: FACES Pre/Post-Treatment    Pain: FACES Scale, Pretreatment 0-->no hurt  -LL     Posttreatment Pain Rating 0-->no hurt  -LL     Row Name 23 1503          Cognition/Psychosocial    Affect/Mental Status (Cognition) WFL  -LL     Orientation Status (Cognition) oriented x 3  -LL     Follows Commands (Cognition) verbal cues/prompting required;physical/tactile prompts required;repetition of directions required  -LL     Personal Safety Interventions fall prevention program maintained;gait belt;nonskid shoes/slippers when out of bed;supervised activity  -LL     Attention Deficit (Cognition) requires cues/redirection to task  -LL     Safety Deficit (Cognition) safety precautions awareness;safety precautions  follow-through/compliance  -LL     Row Name 02/02/23 1503          Bed Mobility    Supine-Sit [place] (Bed Mobility) modified independence  -LL     Sit-Supine [place] (Bed Mobility) modified independence  -LL     Row Name 02/02/23 1503          Transfer Assessment/Treatment    Transfers toilet transfer;car transfer  -LL     Row Name 02/02/23 1503          Bed-Chair Transfer    Bed-Chair [place] (Transfers) contact guard;nonverbal cues (demo/gesture);verbal cues;standby assist  -     Assistive Device (Bed-Chair Transfers) wheelchair  -LL     Row Name 02/02/23 1503          Chair-Bed Transfer    Chair-Bed [place] (Transfers) contact guard;standby assist;verbal cues  -LL     Assistive Device (Chair-Bed Transfers) wheelchair  -LL     Row Name 02/02/23 1503          Sit-Stand Transfer    Sit-Stand [place] (Transfers) verbal cues;nonverbal cues (demo/gesture);contact guard;standby assist  -LL     Row Name 02/02/23 1503          Stand-Sit Transfer    Stand-Sit [place] (Transfers) verbal cues;nonverbal cues (demo/gesture);contact guard;standby assist  -LL     Row Name 02/02/23 1503          Stand Pivot/Stand Step Transfer    Stand Pivot/Stand Step [place] (Transfers) contact guard;verbal cues;nonverbal cues (demo/gesture);standby assist  -LL     Row Name 02/02/23 1503          Toilet Transfer    Type (Toilet Transfer) stand pivot/stand step  -LL     [place] Level (Toilet Transfer) contact guard;standby assist;nonverbal cues (demo/gesture);verbal cues  -     Assistive Device (Toilet Transfer) grab bars/safety frame;commode chair;commode  -LL     Row Name 02/02/23 1503          Car Transfer    Type (Car Transfer) sit-stand;stand-sit  -LL     [place] Level (Car Transfer) contact guard;standby assist  -LL     Row Name 02/02/23 1503          Gait/Stairs (Locomotion)    [place] Level (Gait) verbal cues;nonverbal cues (demo/gesture);contact guard  -     Assistive Device  (Gait) other (see comments)  no AD  -LL     Distance in Feet (Gait) 330' in AM; 340' in PM  -LL     Pattern (Gait) step-through  -LL     Deviations/Abnormal Patterns (Gait) claudia decreased;gait speed decreased;stride length decreased  inconsistent gait speed and step length, may be connected to attention  -LL     Bilateral Gait Deviations forward flexed posture  verbal cues to steer walker, he looks at the floor-repeated cues needed  -LL     [place] Level (Stairs) contact guard  -LL     Handrail Location (Stairs) both sides  -LL     Number of Steps (Stairs) 15  -LL     Ascending Technique (Stairs) step-over-step  -LL     Descending Technique (Stairs) step-over-step  -LL     Stairs, Safety Issues balance decreased during turns  -LL     Row Name 02/02/23 1503          Safety Issues, Functional Mobility    Impairments Affecting Function (Mobility) balance;cognition;endurance/activity tolerance;strength  -LL     Row Name 02/02/23 1503          Balance    Comment, Balance Weaving in/out of cones w/o AD with CGA; standing 2# ball: chest press, overhead press, biceps curl  -     Row Name 02/02/23 1503          Hip (Therapeutic Exercise)    Hip Strengthening (Therapeutic Exercise) bilateral;extension;flexion;aBduction;aDduction;marching while seated;sitting;resistance band;green;marching while standing;mini squats  2.5#  -LL     Row Name 02/02/23 1503          Knee (Therapeutic Exercise)    Knee Strengthening (Therapeutic Exercise) bilateral;flexion;extension;marching while seated;LAQ (long arc quad);hamstring curls;sitting;resistance band;green;marching while standing  2.5#  -LL     Row Name 02/02/23 1503          Ankle (Therapeutic Exercise)    Ankle Strengthening (Therapeutic Exercise) bilateral;dorsiflexion;plantarflexion;sitting;standing  2.5# w/ sitting ex's  -LL     Row Name 02/02/23 1503          Positioning and Restraints    Pre-Treatment Position --  WC w/ OT in AM; bed in PM  -LL     Post Treatment  Position wheelchair  -LL     In Wheelchair sitting;notified nsg;exit alarm on  In front of nurses station in AM; w/ OT in PM  -     Row Name 02/02/23 1503          Therapy Assessment/Plan (PT)    Patient's Goals For Discharge return home  -     Row Name 02/02/23 1503          Therapy Assessment/Plan (PT)    Rehab Potential/Prognosis (PT) adequate, monitor progress closely  -     Frequency of Treatment (PT) 5 times per week  -     Estimated Duration of Therapy (PT) 2 weeks  -     Problem List (PT) balance;cognition;mobility;strength  -LL     Activity Limitations Related to Problem List (PT) unable to ambulate safely;unable to transfer safely  -     Row Name 02/02/23 1503          Daily Progress Summary (PT)    Impairments Still Limiting Function (PT) functional activity tolerance impairment;strength deficit  -[facility] Name 02/02/23 1503          Therapy Plan Review/Discharge Plan (PT)    Anticipated Equipment Needs at Discharge (PT Eval) --  tbd  -     Anticipated Discharge Disposition (PT) home with assist;home with home health;home with outpatient therapy services  -[facility] Name 02/02/23 1503          IRF PT Goals    Bed Mobility Goal Selection (PT-IRF) bed mobility, PT goal 1  -LL     Transfer Goal Selection (PT-IRF) transfers, PT goal 1  -LL     Gait (Walking Locomotion) Goal Selection (PT-IRF) gait, PT goal 1  -[facility] Name 02/02/23 1503          Bed Mobility Goal 1 (PT-IRF)    Activity/Assistive Device (Bed Mobility Goal 1, PT-IRF) sit to supine/supine to sit  -LL     [place] Level (Bed Mobility Goal 1, PT-IRF) modified independence  -     Time Frame (Bed Mobility Goal 1, PT-IRF) by discharge  -LL     Progress/Outcomes (Bed Mobility Goal 1, PT-IRF) goal met  -[facility] Name 02/02/23 1503          Transfer Goal 1 (PT-IRF)    Activity/Assistive Device (Transfer Goal 1, PT-IRF) sit-to-stand/stand-to-sit;bed-to-chair/chair-to-bed  -LL     [place] Level (Transfer Goal 1, PT-IRF)  supervision required  -LL     Time Frame (Transfer Goal 1, PT-IRF) by discharge  -LL     Progress/Outcomes (Transfer Goal 1, PT-IRF) goal partially met  -LL     Row Name 02/02/23 1503          Gait/Walking Locomotion Goal 1 (PT-IRF)    Activity/Assistive Device (Gait/Walking Locomotion Goal 1, PT-IRF) walker, rolling  -LL     Gait/Walking Locomotion Distance Goal 1 (PT-IRF) 300'  -LL     [place] Level (Gait/Walking Locomotion Goal 1, PT-IRF) supervision required  -LL     Time Frame (Gait/Walking Locomotion Goal 1, PT-IRF) by discharge  -LL     Progress/Outcomes (Gait/Walking Locomotion Goal 1, PT-IRF) goal met  -LL           User Key  (r) = Recorded By, (t) = Taken By, (c) = Cosigned By    Initials Name Provider Type    Jewell Pires PTA Physical Therapist Assistant              Wound 01/24/23 1341 Left scalp Laceration (Active)   Dressing Appearance open to air 02/01/23 1903   Closure Approximated 02/01/23 1903   Base dry;clean 02/01/23 1903   Periwound edematous 02/01/23 1903   Drainage Amount none 02/01/23 1903   Care, Wound antimicrobial agent applied 02/01/23 1903   Dressing Care open to air 02/01/23 1903       Wound 01/24/23 1347 Left posterior middle finger Laceration (Active)   Dressing Appearance dry;intact 02/01/23 1903   Base scab 02/01/23 1903   Care, Wound antimicrobial agent applied 02/01/23 1903   Dressing Care dressing changed 02/01/23 1903       Wound Left proximal arm Skin Tear (Active)   Dressing Appearance intact;dry 02/01/23 1903       Wound 01/24/23 1352 Left anterior thigh Abrasion (Active)   Dressing Appearance dry;intact 02/01/23 1903   Base blanchable;moist;red 02/01/23 1903   Periwound redness 02/01/23 1903   Care, Wound antimicrobial agent applied 02/01/23 1903   Dressing Care dressing changed 02/01/23 1903     Physical Therapy Education     Title: PT OT SLP Therapies (Done)     Topic: Physical Therapy (Done)     Point: Mobility training (Done)     Learning Progress Summary            Patient Acceptance, D, VU,NR by LL at 2/2/2023 1513    Acceptance, E,TB, DU,VU by HC at 2/1/2023 0931    Acceptance, E,TB, VU,DU by HC at 1/31/2023 1449    Acceptance, E,TB, VU,DU by HC at 1/30/2023 1437    Acceptance, E,TB, VU by DG at 1/28/2023 1937    Acceptance, E,TB, VU by RF at 1/28/2023 1522    Acceptance, E,TB, VU,DU by HC at 1/27/2023 1459    Acceptance, E,TB, VU by DG at 1/26/2023 2001    Acceptance, E,D, VU,NR by LL at 1/26/2023 1551    Acceptance, E,TB,D, VU,NR,DU by HC at 1/26/2023 1507    Acceptance, E, VU,NR by LB at 1/25/2023 1501                   Point: Home exercise program (Done)     Learning Progress Summary           Patient Acceptance, D, VU,NR by LL at 2/2/2023 1513    Acceptance, E,TB, DU,VU by HC at 2/1/2023 0931    Acceptance, E,TB, VU,DU by HC at 1/31/2023 1449    Acceptance, E,TB, VU,DU by HC at 1/30/2023 1437    Acceptance, E,TB, VU by DG at 1/28/2023 1937    Acceptance, E,TB, VU by RF at 1/28/2023 1522    Acceptance, E,TB, VU,DU by HC at 1/27/2023 1459    Acceptance, E,TB, VU by DG at 1/26/2023 2001    Acceptance, E,D, VU,NR by LL at 1/26/2023 1551    Acceptance, E,TB,D, VU,NR,DU by HC at 1/26/2023 1507    Acceptance, E, VU,NR by LB at 1/25/2023 1501                   Point: Body mechanics (Done)     Learning Progress Summary           Patient Acceptance, D, VU,NR by LL at 2/2/2023 1513    Acceptance, E,TB, DU,VU by HC at 2/1/2023 0931    Acceptance, E,TB, VU,DU by HC at 1/31/2023 1449    Acceptance, E,TB, VU,DU by HC at 1/30/2023 1437    Acceptance, E,TB, VU by DG at 1/28/2023 1937    Acceptance, E,TB, VU by RF at 1/28/2023 1522    Acceptance, E,TB, VU,DU by HC at 1/27/2023 1459    Acceptance, E,TB, VU by DG at 1/26/2023 2001    Acceptance, E,D, VU,NR by LL at 1/26/2023 1551    Acceptance, E,TB,D, VU,NR,DU by HC at 1/26/2023 1507    Acceptance, E, VU,NR by LB at 1/25/2023 1501                   Point: Precautions (Done)     Learning Progress Summary           Patient Acceptance,  D, VU,NR by LL at 2/2/2023 1513    Acceptance, E,TB, DU,VU by HC at 2/1/2023 0931    Acceptance, E,TB, VU,DU by HC at 1/31/2023 1449    Acceptance, E,TB, VU,DU by HC at 1/30/2023 1437    Acceptance, E,TB, VU by DG at 1/28/2023 1937    Acceptance, E,TB, VU by RF at 1/28/2023 1522    Acceptance, E,TB, VU,DU by HC at 1/27/2023 1459    Acceptance, E,TB, VU by DG at 1/26/2023 2001    Acceptance, E,D, VU,NR by LL at 1/26/2023 1551    Acceptance, E,TB,D, VU,NR,DU by HC at 1/26/2023 1507    Acceptance, E, VU,NR by LB at 1/25/2023 1501                               User Key     Initials Effective Dates Name Provider Type Discipline    DG 06/16/21 -  Chantal Gamez RN Registered Nurse Nurse    LB 06/16/21 -  Sole Vasquez, PT Physical Therapist PT    LL 05/02/16 -  Jewell Kessler PTA Physical Therapist Assistant PT     06/16/21 -  Sheila Plummer PTA Physical Therapist Assistant PT     01/20/23 -  Kimberly Graves PTA Physical Therapist Assistant PT                PT Recommendation and Plan    Frequency of Treatment (PT): 5 times per week  Anticipated Equipment Needs at Discharge (PT Eval):  (tbd)  Daily Progress Summary (PT)  Impairments Still Limiting Function (PT): functional activity tolerance impairment, strength deficit               Time Calculation:      PT Charges     Row Name 02/02/23 1517 02/02/23 1513          Time Calculation    Start Time 1245  -LL 0915  -LL     Stop Time 1330  -LL 1000  -LL     Time Calculation (min) 45 min  -LL 45 min  -LL     PT Received On -- 02/02/23  -     PT Goal Re-Cert Due Date -- 02/09/23  -        Time Calculation- PT    Total Timed Code Minutes- PT 45 minute(s)  -LL 45 minute(s)  -LL           User Key  (r) = Recorded By, (t) = Taken By, (c) = Cosigned By    Initials Name Provider Type    LL Jewell Kessler, ANA M Physical Therapist Assistant                Therapy Charges for Today     Code Description Service Date Service Provider Modifiers Qty     43726955393  GAIT TRAINING EA 15 MIN 2/2/2023 Jewell Kessler, ANA M GP, CQ 2    62579216686 HC PT NEUROMUSC RE EDUCATION EA 15 MIN 2/2/2023 Jewell Kessler, PTA GP, CQ 2    [id number] HC PT THER PROC EA 15 MIN 2/2/2023 Jewell Kessler, ANA M GP, CQ 2                   Jewell. ANA M Kessler  2/2/2023
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
## 2023-02-02 NOTE — PLAN OF CARE
Goal Outcome Evaluation:  Plan of Care Reviewed With: patient        Progress: improving  Outcome Evaluation: Pt resting in bed at this time. No acute changes noted. Cont POC.

## 2023-02-02 NOTE — THERAPY TREATMENT NOTE
Inpatient Rehabilitation - Occupational Therapy Treatment Note     [place]     Patient Name: Jose Penn  : 1928  MRN: [id number]    Today's Date: 2023                 Admit Date: 2023       No diagnosis found.    Patient Active Problem List   Diagnosis   • Carotid stenosis   • Headache   • Arthritis   • Stroke (HCC)   • Multiple trauma       Past Medical History:   Diagnosis Date   • Arthritis    • Carotid stenosis    • Headache    • Hypertension    • Stroke (HCC)        Past Surgical History:   Procedure Laterality Date   • CHOLECYSTECTOMY     • PROSTATE SURGERY               IRF OT ASSESSMENT FLOWSHEET (last 12 hours)     IRF OT Evaluation and Treatment     Row Name 23 1447          OT Time and Intention    Document Type daily treatment  -     Mode of Treatment occupational therapy  -     Symptoms Noted During/After Treatment none  -     Row Name 23 144          General Information    Patient/Family/Caregiver Comments/Observations agreeable to therapy  -     Existing Precautions/Restrictions fall;lifting  no lifting greater than 10 lbs  -     Row Name 23 1447          Cognition/Psychosocial    Follows Commands (Cognition) verbal cues/prompting required;repetition of directions required  -     Attention Deficit (Cognition) requires cues/redirection to task  -     Row Name 23 1447          Bathing    [place] Level (Bathing) contact guard assist;minimum assist (75% patient effort);verbal cues  [facility]     Assistive Device (Bathing) long-handled sponge  -     Row Name 23 1447          Upper Body Dressing    [place] Level (Upper Body Dressing) set up assistance;pull over garment  -     Row Name 23 1447          Lower Body Dressing    [place] Level (Lower Body Dressing) contact guard assist;minimum assist (75% patient effort);verbal cues  [facility]     Assistive Device Use (Lower Body Dressing) sock-aid  -     Row Name 23 1444           Grooming    [place] Level (Grooming) set up  -     Row Name 02/02/23 1447          Toileting    [place] Level (Toileting) contact guard assist;verbal cues  -     Assistive Device Use (Toileting) grab bar/safety frame;raised toilet seat  -     Row Name 02/02/23 1447          Self-Feeding    [place] Level (Self-Feeding) modified independence  -     Row Name 02/02/23 1447          Bed-Chair Transfer    Bed-Chair [place] (Transfers) contact guard;standby assist;verbal cues  -     Assistive Device (Bed-Chair Transfers) walker, front-wheeled;wheelchair  -     Row Name 02/02/23 1447          Chair-Bed Transfer    Chair-Bed [place] (Transfers) contact guard;standby assist;verbal cues  -     Assistive Device (Chair-Bed Transfers) walker, front-wheeled;wheelchair  -     Row Name 02/02/23 1447          Toilet Transfer    [place] Level (Toilet Transfer) contact guard;standby assist;verbal cues  -     Assistive Device (Toilet Transfer) grab bars/safety frame;raised toilet seat  -     Row Name 02/02/23 1447          Safety Issues, Functional Mobility    Safety Issues Affecting Function (Mobility) safety precaution awareness  -     Row Name 02/02/23 1447          Motor Skills    Motor Skills functional endurance  -     Motor Control/Coordination Interventions therapeutic exercise/ROM;gross motor coordination activities;fine motor manipulation/dexterity activities  BUE ther ex/act, AROM, bilat coord ex, GMC/FMC  -     Therapeutic Exercise --  dowel ex;  wrist rolls  -     Row Name 02/02/23 1447          Positioning and Restraints    Post Treatment Position bed  -CJ     In Bed call light within reach;encouraged to call for assist;exit alarm on;notified nsg  pm  -CJ     In Wheelchair with PT  am  -CJ           User Key  (r) = Recorded By, (t) = Taken By, (c) = Cosigned By    Initials Name Effective Dates    CJ Padmaja Cordova, OT 06/16/21 -                   Occupational Therapy Education     Title: PT OT SLP Therapies (Done)     Topic: Occupational Therapy (Done)     Point: ADL training (Done)     Description:   Instruct learner(s) on proper safety adaptation and remediation techniques during self care or transfers.   Instruct in proper use of assistive devices.              Learning Progress Summary           Patient Acceptance, E,D, VU,NR by  at 2/2/2023 1454    Acceptance, E,D, VU,NR by  at 2/1/2023 1507    Acceptance, E,D, VU,NR by  at 1/31/2023 1430    Acceptance, E,D, VU,NR by  at 1/30/2023 1412    Acceptance, E,TB, VU by  at 1/28/2023 1937    Acceptance, E, VU,NR by  at 1/28/2023 1142    Acceptance, E,D, VU,NR by  at 1/27/2023 1321    Acceptance, E,TB, VU by  at 1/26/2023 2001    Acceptance, E,D, VU,NR by  at 1/26/2023 1503    Acceptance, E,D, VU,NR by  at 1/25/2023 1523                   Point: Precautions (Done)     Description:   Instruct learner(s) on prescribed precautions during self-care and functional transfers.              Learning Progress Summary           Patient Acceptance, E,D, VU,NR by  at 2/2/2023 1454    Acceptance, E,D, VU,NR by  at 2/1/2023 1507    Acceptance, E,D, VU,NR by  at 1/31/2023 1430    Acceptance, E,D, VU,NR by  at 1/30/2023 1412    Acceptance, E,TB, VU by  at 1/28/2023 1937    Acceptance, E, VU,NR by  at 1/28/2023 1142    Acceptance, E,D, VU,NR by  at 1/27/2023 1321    Acceptance, E,TB, VU by  at 1/26/2023 2001    Acceptance, E,D, VU,NR by  at 1/26/2023 1503    Acceptance, E,D, VU,NR by  at 1/25/2023 1523                               User Key     Initials Effective Dates Name Provider Type Discipline    KEENAN 06/16/21 -  Chantal Gamez RN Registered Nurse Nurse     06/16/21 -  Padmaja Cordova OT Occupational Therapist OT     05/25/21 -  Meme Arthur OT Occupational Therapist OT                    OT Recommendation and Plan    Planned Therapy Interventions (OT): activity tolerance  training, adaptive equipment training, BADL retraining, occupation/activity based interventions, patient/caregiver education/training, ROM/therapeutic exercise, strengthening exercise, transfer/mobility retraining                    Time Calculation:      Time Calculation- OT     Row Name 02/02/23 1456 02/02/23 1453          Time Calculation- OT    OT Start Time 1330  - 0815  -     OT Stop Time 1425  - 0915  -     OT Time Calculation (min) 55 min  - 60 min  -     Total Timed Code Minutes- OT 55 minute(s)  - 60 minute(s)  -           User Key  (r) = Recorded By, (t) = Taken By, (c) = Cosigned By    Initials Name Provider Type     Padmaja Cordova OT Occupational Therapist              Therapy Charges for Today     Code Description Service Date Service Provider Modifiers Qty    33141133013 HC OT SELF CARE/MGMT/TRAIN EA 15 MIN 2/1/2023 Padmaja Cordova OT GO 2    54206270468 HC OT THER PROC EA 15 MIN 2/1/2023 Padmaja Cordova OT GO 3    23800728032 HC OT THERAPEUTIC ACT EA 15 MIN 2/1/2023 Padmaja Cordova OT GO 2    10961484676 HC OT SELF CARE/MGMT/TRAIN EA 15 MIN 2/2/2023 Padmaja Cordova OT GO 4    04044543914 HC OT THER PROC EA 15 MIN 2/2/2023 Padmaja Cordova OT GO 2    94511343714 HC OT THERAPEUTIC ACT EA 15 MIN 2/2/2023 Padmaja Cordova, OT GO 2                   Padmaja Cordova OT  2/2/2023

## 2023-02-03 VITALS
HEIGHT: 69 IN | WEIGHT: 176 LBS | DIASTOLIC BLOOD PRESSURE: 70 MMHG | OXYGEN SATURATION: 97 % | RESPIRATION RATE: 18 BRPM | TEMPERATURE: 98.3 F | HEART RATE: 71 BPM | BODY MASS INDEX: 26.07 KG/M2 | SYSTOLIC BLOOD PRESSURE: 152 MMHG

## 2023-02-03 LAB
ANION GAP SERPL CALCULATED.3IONS-SCNC: 10.7 MMOL/L (ref 5–15)
BASOPHILS # BLD AUTO: 0.05 10*3/MM3 (ref 0–0.2)
BASOPHILS NFR BLD AUTO: 0.6 % (ref 0–1.5)
BUN SERPL-MCNC: 27 MG/DL (ref 8–23)
BUN/CREAT SERPL: 20.8 (ref 7–25)
CALCIUM SPEC-SCNC: 8.7 MG/DL (ref 8.2–9.6)
CHLORIDE SERPL-SCNC: 111 MMOL/L (ref 98–107)
CO2 SERPL-SCNC: 22.3 MMOL/L (ref 22–29)
CREAT SERPL-MCNC: 1.3 MG/DL (ref 0.76–1.27)
DEPRECATED RDW RBC AUTO: 51.8 FL (ref 37–54)
EGFRCR SERPLBLD CKD-EPI 2021: 50.9 ML/MIN/1.73
EOSINOPHIL # BLD AUTO: 0.52 10*3/MM3 (ref 0–0.4)
EOSINOPHIL NFR BLD AUTO: 5.9 % (ref 0.3–6.2)
ERYTHROCYTE [DISTWIDTH] IN BLOOD BY AUTOMATED COUNT: 14.5 % (ref 12.3–15.4)
GLUCOSE SERPL-MCNC: 113 MG/DL (ref 65–99)
HCT VFR BLD AUTO: 35.4 % (ref 37.5–51)
HGB BLD-MCNC: 11.6 G/DL (ref 13–17.7)
IMM GRANULOCYTES # BLD AUTO: 0.03 10*3/MM3 (ref 0–0.05)
IMM GRANULOCYTES NFR BLD AUTO: 0.3 % (ref 0–0.5)
LYMPHOCYTES # BLD AUTO: 1.37 10*3/MM3 (ref 0.7–3.1)
LYMPHOCYTES NFR BLD AUTO: 15.5 % (ref 19.6–45.3)
MCH RBC QN AUTO: 32.3 PG (ref 26.6–33)
MCHC RBC AUTO-ENTMCNC: 32.8 G/DL (ref 31.5–35.7)
MCV RBC AUTO: 98.6 FL (ref 79–97)
MONOCYTES # BLD AUTO: 0.67 10*3/MM3 (ref 0.1–0.9)
MONOCYTES NFR BLD AUTO: 7.6 % (ref 5–12)
NEUTROPHILS NFR BLD AUTO: 6.2 10*3/MM3 (ref 1.7–7)
NEUTROPHILS NFR BLD AUTO: 70.1 % (ref 42.7–76)
NRBC BLD AUTO-RTO: 0 /100 WBC (ref 0–0.2)
PLATELET # BLD AUTO: 297 10*3/MM3 (ref 140–450)
PMV BLD AUTO: 10 FL (ref 6–12)
POTASSIUM SERPL-SCNC: 4.6 MMOL/L (ref 3.5–5.2)
RBC # BLD AUTO: 3.59 10*6/MM3 (ref 4.14–5.8)
SODIUM SERPL-SCNC: 144 MMOL/L (ref 136–145)
WBC NRBC COR # BLD: 8.84 10*3/MM3 (ref 3.4–10.8)

## 2023-02-03 PROCEDURE — 85025 COMPLETE CBC W/AUTO DIFF WBC: CPT | Performed by: INTERNAL MEDICINE

## 2023-02-03 PROCEDURE — 94799 UNLISTED PULMONARY SVC/PX: CPT

## 2023-02-03 PROCEDURE — 97110 THERAPEUTIC EXERCISES: CPT

## 2023-02-03 PROCEDURE — 97530 THERAPEUTIC ACTIVITIES: CPT

## 2023-02-03 PROCEDURE — 80048 BASIC METABOLIC PNL TOTAL CA: CPT | Performed by: INTERNAL MEDICINE

## 2023-02-03 PROCEDURE — 99239 HOSP IP/OBS DSCHRG MGMT >30: CPT | Performed by: INTERNAL MEDICINE

## 2023-02-03 PROCEDURE — 94761 N-INVAS EAR/PLS OXIMETRY MLT: CPT

## 2023-02-03 PROCEDURE — 63710000001 REVEFENACIN 175 MCG/3ML SOLUTION: Performed by: FAMILY MEDICINE

## 2023-02-03 PROCEDURE — 97535 SELF CARE MNGMENT TRAINING: CPT

## 2023-02-03 RX ORDER — IBUPROFEN 200 MG
1 TABLET ORAL EVERY 12 HOURS SCHEDULED
Start: 2023-02-03

## 2023-02-03 RX ORDER — IPRATROPIUM BROMIDE AND ALBUTEROL SULFATE 2.5; .5 MG/3ML; MG/3ML
3 SOLUTION RESPIRATORY (INHALATION) EVERY 6 HOURS PRN
Start: 2023-02-03

## 2023-02-03 RX ORDER — POLYETHYLENE GLYCOL 3350 17 G/17G
17 POWDER, FOR SOLUTION ORAL DAILY
Qty: 30 PACKET | Refills: 0 | Status: SHIPPED | OUTPATIENT
Start: 2023-02-03

## 2023-02-03 RX ORDER — DOXAZOSIN MESYLATE 4 MG/1
2 TABLET ORAL NIGHTLY
Start: 2023-02-03

## 2023-02-03 RX ADMIN — ARFORMOTEROL TARTRATE 15 MCG: 15 SOLUTION RESPIRATORY (INHALATION) at 07:48

## 2023-02-03 RX ADMIN — APIXABAN 2.5 MG: 2.5 TABLET, FILM COATED ORAL at 08:54

## 2023-02-03 RX ADMIN — Medication 1 TABLET: at 08:54

## 2023-02-03 RX ADMIN — BACITRACIN ZINC NEOMYCIN SULFATE POLYMYXIN B SULFATE 1 APPLICATION: 400; 3.5; 5 OINTMENT TOPICAL at 08:54

## 2023-02-03 RX ADMIN — ROSUVASTATIN CALCIUM 10 MG: 10 TABLET, FILM COATED ORAL at 08:54

## 2023-02-03 RX ADMIN — ASPIRIN 81 MG: 81 TABLET, COATED ORAL at 08:54

## 2023-02-03 RX ADMIN — METOPROLOL TARTRATE 12.5 MG: 25 TABLET, FILM COATED ORAL at 08:55

## 2023-02-03 RX ADMIN — PANTOPRAZOLE SODIUM 40 MG: 40 TABLET, DELAYED RELEASE ORAL at 05:06

## 2023-02-03 RX ADMIN — REVEFENACIN 175 MCG: 175 SOLUTION RESPIRATORY (INHALATION) at 07:50

## 2023-02-03 RX ADMIN — BUDESONIDE 0.5 MG: 0.5 SUSPENSION RESPIRATORY (INHALATION) at 07:48

## 2023-02-03 RX ADMIN — SERTRALINE 50 MG: 50 TABLET, FILM COATED ORAL at 08:54

## 2023-02-03 NOTE — PROGRESS NOTES
"Patient Assessment Instrument  Quality Indicators - Discharge FY 2023    Section A. Transportation      Section A. Medication List  Medication List to Subsequent Provider:  Not applicable.  Patient was not  discharged to a subsequent provider.  Discharge Location:  01 - Home  Medication List to Patient at Discharge:  Yes - Current reconciled medication  list provided to the patient, family and/or caregiver  Route(s) of Medication List Transmission to Patient:  Paper-based (e.g., fax,  copies, printouts)    Section B. Health Literacy  Frequency of Needing Assistance Reading:  Sometimes    Section C. BIMS  Brief Interview for Mental Status (BIMS) was conducted.  Repetition of Three Words: Three words  Able to report correct year: Correct  Able to report correct month: Accurate within 5 days  Able to report correct day of the week: Correct  Able to recall \"sock\": Yes, no cue required  Able to recall \"blue\": Yes, no cue required  Able to recall \"bed\": Yes, no cue required    BIMS SUMMARY SCORE: 15 Cognitively intact    Section C. Signs and Symptoms of Delirium (from CAM)  Acute Change in Mental Status:   No  Inattention:   Behavior not present  Disorganized Thinking:   Behavior not present  Altered Level of Consciousness:   Behavior not present    Section D. Mood  Presence of little interest or pleasure in doing things:   No  Frequency of having little interest or pleasure in doing things:   Never or 1  day  Presence of feeling down, depressed, or hopeless:   No  Frequency of feeling down, depressed, or hopeless:   Never or 1 day   Interview Ended. Above responses do not meet criteria to continue  Total Severity Score:   00    Section D. Social Isolation  Frequency of Feeling Lonely or Isolated:  Rarely    Section GG. Self-Care Performance      Section GG. Mobility Performance      Section J. Health Conditions Discharge      Section J. Health Conditions (Pain)  Pain Effect on Sleep:   Rarely or not at all  Pain " Interference with Therapy Activities:   Rarely or not at all  Pain Interference with Day-to-Day Activities:   Rarely or not at all    Section K. Swallowing/Nutritional Status  Nutritional Approaches Past 7 Days:  Nutritional Approaches at Discharge:    Section M. Skin Conditions Discharge  Unhealed Pressure Ulcer(s)/Injurie(s) at Stage 1 or Higher:  No    . Current Number of Unhealed Pressure Ulcers      Section N. Medication    Medication Intervention: Not applicable - There were no potential clinically  significant medication issues identified since admission or patient is not  taking any medications.      Section O. Special Treatments, Procedures, and Programs  None    Signed by: Nurse Van

## 2023-02-03 NOTE — PROGRESS NOTES
Patient Assessment Instrument  Quality Indicators - Discharge FY 2023    Section A. Transportation  Issues Due to Lack of Transportation:  No    Section A. Medication List        Section B. Health Literacy      Section C. BIMS      Section C. Signs and Symptoms of Delirium (from CAM)      Section D. Mood      Section D. Social Isolation      Section GG. Self-Care Performance      Section GG. Mobility Performance      Section J. Health Conditions Discharge      Section J. Health Conditions (Pain)      Section K. Swallowing/Nutritional Status  Nutritional Approaches Past 7 Days:  Nutritional Approaches at Discharge:    Section M. Skin Conditions Discharge      . Current Number of Unhealed Pressure Ulcers      Section N. Medication        Section O. Special Treatments, Procedures, and Programs    Signed by: SHANI Santa

## 2023-02-03 NOTE — PLAN OF CARE
Goal Outcome Evaluation:  Plan of Care Reviewed With: patient        Progress: no change  Outcome Evaluation: Pt resting in bed at this time. No acute changes noted. Cont POC.

## 2023-02-03 NOTE — PLAN OF CARE
Goal Outcome Evaluation:  Plan of Care Reviewed With: patient        Progress: improving  Outcome Evaluation: patient being discharged today.

## 2023-02-03 NOTE — PROGRESS NOTES
Occupational Therapy: Individual: 85 minutes.    Physical Therapy:    Speech Language Pathology:    Signed by: Padmaja Cordova, Occupational Therapist

## 2023-02-03 NOTE — SIGNIFICANT NOTE
02/03/23 1130   Plan   Plan Spoke to pt and daughters Chantal and Sarah about discharge, pt declining home health therapy, PT/OT providing home exercise programs per pt request.  Daughters have a caregiver to stay with pt at night and daughters will be staying and checking on pt during the day.  Daughters will transport pt home.  No other needs identified at this time.   Patient/Family in Agreement with Plan yes   Final Discharge Disposition Code 01 - home or self-care

## 2023-02-03 NOTE — THERAPY DISCHARGE NOTE
Inpatient Rehabilitation - IRF Occupational Therapy Treatment Note/Discharge   [place]     Patient Name: Jose Penn  : 1928  MRN: [id number]  Today's Date: 2/3/2023               Admit Date: 2023     No diagnosis found.  Patient Active Problem List   Diagnosis   • Carotid stenosis   • Headache   • Arthritis   • Stroke (HCC)   • Multiple trauma     Past Medical History:   Diagnosis Date   • Arthritis    • Carotid stenosis    • Headache    • Hypertension    • Stroke (HCC)      Past Surgical History:   Procedure Laterality Date   • CHOLECYSTECTOMY     • PROSTATE SURGERY         IRF OT ASSESSMENT FLOWSHEET (last 12 hours)     IRF OT Evaluation and Treatment     Row Name 23 1216          OT Time and Intention    Document Type discharge evaluation;daily treatment  -     Mode of Treatment occupational therapy  -     Symptoms Noted During/After Treatment none  -     Row Name 23 1216          General Information    Patient/Family/Caregiver Comments/Observations agreeable to therapy  -     Existing Precautions/Restrictions fall;lifting  no lifting greater than 10 lbs  -     Row Name 23 1216          Cognition/Psychosocial    Follows Commands (Cognition) verbal cues/prompting required;repetition of directions required  -     Personal Safety Interventions gait belt;fall prevention program maintained;nonskid shoes/slippers when out of bed;supervised activity  -     Attention Deficit (Cognition) requires cues/redirection to task  -     Safety Deficit (Cognition) safety precautions awareness;safety precautions follow-through/compliance  -     Row Name 23 1216          Range of Motion Comprehensive    General Range of Motion bilateral upper extremity ROM WFL  -     Row Name 23 1216          Strength Comprehensive (MMT)    Comment, General Manual Muscle Testing (MMT) Assessment BUE - deferred  -     Row Name 23 1216          Vision  Assessment/Intervention    Visual Impairment/Limitations corrective lenses full-time  -     Row Name 02/03/23 1216          Bathing    Assistive Device (Bathing) long-handled sponge  -     Comment (Bathing) CGA/[place]  -     Row Name 02/03/23 1216          Upper Body Dressing    Comment (Upper Body Dressing) Set up  -     Row Name 02/03/23 1216          Lower Body Dressing    Assistive Device Use (Lower Body Dressing) sock-aid  -     Comment (Lower Body Dressing) CGA  -     Row Name 02/03/23 1216          Grooming    Comment (Grooming) Set up  -     Row Name 02/03/23 1216          Toileting    Assistive Device Use (Toileting) grab bar/safety frame;raised toilet seat  -     Comment (Toileting) CGA  -     Row Name 02/03/23 1216          Self-Feeding    [place] Level (Self-Feeding) modified independence  -     Row Name 02/03/23 1216          Bed-Chair Transfer    Bed-Chair [place] (Transfers) contact guard;standby assist;verbal cues  -     Assistive Device (Bed-Chair Transfers) wheelchair  -     Row Name 02/03/23 1216          Chair-Bed Transfer    Chair-Bed [place] (Transfers) contact guard;standby assist;verbal cues  -     Assistive Device (Chair-Bed Transfers) wheelchair  -     Row Name 02/03/23 1216          Toilet Transfer    [place] Level (Toilet Transfer) contact guard;standby assist;verbal cues  -     Assistive Device (Toilet Transfer) grab bars/safety frame;raised toilet seat  -     Row Name 02/03/23 1216          Motor Skills    Motor Skills functional endurance  -     Motor Control/Coordination Interventions therapeutic exercise/ROM  BUE ther ex/act, AROM, bilat coord ex  -     Therapeutic Exercise --  bilat vianey, hand exerciser, wrist rolls  -     Row Name 02/03/23 1216          Positioning and Restraints    Post Treatment Position bed  -     In Bed call light within reach;encouraged to call for assist;exit alarm on;notified nsg;with  family/caregiver  both sessions;  w/family -second tx  -CJ     Row Name 02/03/23 1216          LB Dressing Goal 1 (OT-IRF)    Progress/Outcomes (LB Dressing Goal 1, OT-IRF) goal met  -CJ     Row Name 02/03/23 1216          LB Dressing Goal 2 (OT-IRF)    Progress/Outcomes (LB Dressing Goal 2, OT-IRF) goal met  -CJ     Row Name 02/03/23 1216          Toileting Goal 1 (OT-IRF)    Progress/Outcomes (Toileting Goal 1, OT-IRF) goal met  -CJ     Row Name 02/03/23 1216          Toileting Goal 2 (OT-IRF)    Progress/Outcomes (Toileting Goal 2, OT-IRF) goal met  -CJ     Row Name 02/03/23 1216          Discharge Summary (OT)    Discharge Summary Statement (OT) Education provided to pt and family re:  ADL status, safety, lifting restrictions, AE, DME, home program, precautions, 24 hr assistance and D/C concerns.  Verbalized understanding.  -           User Key  (r) = Recorded By, (t) = Taken By, (c) = Cosigned By    Initials Name Effective Dates    CJ Padmaja Cordova, OT 06/16/21 -               Wound 01/24/23 1341 Left scalp Laceration (Active)   Dressing Appearance open to air 02/03/23 0705   Closure Approximated 02/02/23 1905   Base dry;clean 02/03/23 0705   Periwound edematous 02/02/23 1905   Drainage Amount none 02/03/23 0705   Care, Wound antimicrobial agent applied 02/02/23 1905   Dressing Care open to air 02/03/23 0705       Wound 01/24/23 1347 Left posterior middle finger Laceration (Active)   Dressing Appearance dry;intact 02/03/23 0705   Base scab 02/02/23 1905   Dressing Care dressing changed 02/03/23 0527       Wound Left proximal arm Skin Tear (Active)   Dressing Appearance dry;intact 02/03/23 0705   Dressing Care open to air 02/02/23 1905       Wound 01/24/23 1352 Left anterior thigh Abrasion (Active)   Dressing Appearance dry;intact 02/03/23 0705       Occupational Therapy Education     Title: PT OT SLP Therapies (Resolved)     Topic: Occupational Therapy (Resolved)     Point: ADL training (Resolved)      Description:   Instruct learner(s) on proper safety adaptation and remediation techniques during self care or transfers.   Instruct in proper use of assistive devices.              Learning Progress Summary           Patient Acceptance, E,D,H, VU by  at 2/3/2023 1226    Acceptance, E,D, VU,NR by  at 2/2/2023 1454    Acceptance, E,D, VU,NR by  at 2/1/2023 1507    Acceptance, E,D, VU,NR by  at 1/31/2023 1430    Acceptance, E,D, VU,NR by CJ at 1/30/2023 1412    Acceptance, E,TB, VU by KEENAN at 1/28/2023 1937    Acceptance, E, VU,NR by HB at 1/28/2023 1142    Acceptance, E,D, VU,NR by  at 1/27/2023 1321    Acceptance, E,TB, VU by KEENAN at 1/26/2023 2001    Acceptance, E,D, VU,NR by  at 1/26/2023 1503    Acceptance, E,D, VU,NR by  at 1/25/2023 1523   Family Acceptance, E,D,H, VU by  at 2/3/2023 1226                   Point: Home exercise program (Resolved)     Description:   Instruct learner(s) on appropriate technique for monitoring, assisting and/or progressing therapeutic exercises/activities.              Learning Progress Summary           Patient Acceptance, E,D,H, VU by  at 2/3/2023 1226   Family Acceptance, E,D,H, VU by  at 2/3/2023 1226                   Point: Precautions (Resolved)     Description:   Instruct learner(s) on prescribed precautions during self-care and functional transfers.              Learning Progress Summary           Patient Acceptance, E,D,H, VU by  at 2/3/2023 1226    Acceptance, E,D, VU,NR by  at 2/2/2023 1454    Acceptance, E,D, VU,NR by  at 2/1/2023 1507    Acceptance, E,D, VU,NR by  at 1/31/2023 1430    Acceptance, E,D, VU,NR by  at 1/30/2023 1412    Acceptance, E,TB, VU by KEENAN at 1/28/2023 1937    Acceptance, E, VU,NR by HB at 1/28/2023 1142    Acceptance, E,D, VU,NR by CJ at 1/27/2023 1321    Acceptance, E,TB, VU by DG at 1/26/2023 2001    Acceptance, E,D, VU,NR by CJ at 1/26/2023 1503    Acceptance, E,D, VU,NR by CJ at 1/25/2023 1523   Family Acceptance,  E,D,H, VU by  at 2/3/2023 1226                               User Key     Initials Effective Dates Name Provider Type Discipline     06/16/21 -  Chantal Gamez, RN Registered Nurse Nurse     06/16/21 -  Padmaja Cordova, OT Occupational Therapist OT     05/25/21 -  Meme Arthur OT Occupational Therapist OT                OT Recommendation and Plan  Anticipated Discharge Disposition (OT): home with 24/7 care, home with home health  Planned Therapy Interventions (OT): activity tolerance training, adaptive equipment training, BADL retraining, occupation/activity based interventions, patient/caregiver education/training, ROM/therapeutic exercise, strengthening exercise, transfer/mobility retraining           OT IRF GOALS     Row Name 02/03/23 1216 02/01/23 1512 02/01/23 1451       LB Dressing Goal 1 (OT-IRF)    [place] (LB Dressing Goal 1, OT-IRF) -- -- moderate assist (50-74% patient effort)  -    Progress/Outcomes (LB Dressing Goal 1, OT-IRF) goal met  - -- goal met  -       LB Dressing Goal 2 (OT-IRF)    Progress/Outcomes (LB Dressing Goal 2, OT-IRF) goal met  - -- --       Toileting Goal 1 (OT-IRF)    [place] Level (Toileting Goal 1, OT-IRF) -- contact guard required;minimum assist (75% or more patient effort)  - moderate assist (50-74% patient effort)  -    Progress/Outcomes (Toileting Goal 1, OT-IRF) goal met  - new goal  - goal met  -    Time Frame (Toileting Goal 1, OT-IRF) -- short-term goal (STG)  - --       Toileting Goal 2 (OT-IRF)    Progress/Outcomes (Toileting Goal 2, OT-IRF) goal met  - -- --    Row Name 01/25/23 1457             LB Dressing Goal 1 (OT-IRF)    [place] (LB Dressing Goal 1, OT-IRF) moderate assist (50-74% patient effort)  -      Time Frame (LB Dressing Goal 1, OT-IRF) short-term goal (STG)  -         LB Dressing Goal 2 (OT-IRF)    [place] (LB Dressing Goal 2, OT-IRF) minimum assist (75% or more patient effort)  -      Time Frame (LB  Dressing Goal 2, OT-IRF) long-term goal (LTG);by discharge  -         Toileting Goal 1 (OT-IRF)    [place] Level (Toileting Goal 1, OT-IRF) moderate assist (50-74% patient effort)  -      Time Frame (Toileting Goal 1, OT-IRF) short-term goal (STG)  -         Toileting Goal 2 (OT-IRF)    [place] Level (Toileting Goal 2, OT-IRF) contact guard required;minimum assist (75% or more patient effort)  -      Time Frame (Toileting Goal 2, OT-IRF) long-term goal (LTG);by discharge  -            User Key  (r) = Recorded By, (t) = Taken By, (c) = Cosigned By    Initials Name Provider Type    Padmaja Singh OT Occupational Therapist                    Time Calculation:    Time Calculation- OT     Row Name 02/03/23 1228 02/03/23 1227          Time Calculation- OT    OT Start Time 1140  - 0805  -     OT Stop Time 1150  - 0920  -     OT Time Calculation (min) 10 min  - 75 min  -     Total Timed Code Minutes- OT 10 minute(s)  -CJ 75 minute(s)  -           User Key  (r) = Recorded By, (t) = Taken By, (c) = Cosigned By    Initials Name Provider Type    Padmaja Singh OT Occupational Therapist                Therapy Charges for Today     Code Description Service Date Service Provider Modifiers Qty    22299278107 HC OT SELF CARE/MGMT/TRAIN EA 15 MIN 2/2/2023 Padmaja Cordova OT GO 4    89529503302 HC OT THER PROC EA 15 MIN 2/2/2023 Padmaja Cordova OT GO 2    92142209090 HC OT THERAPEUTIC ACT EA 15 MIN 2/2/2023 Padmaja Cordova OT GO 2    25988524438 HC OT SELF CARE/MGMT/TRAIN EA 15 MIN 2/3/2023 Padmaja Cordova OT GO 3    74008963423 HC OT THER PROC EA 15 MIN 2/3/2023 Padmaja Cordova OT GO 2    15232926972 HC OT THERAPEUTIC ACT EA 15 MIN 2/3/2023 Padmaja Cordova OT GO 1               OT Discharge Summary  Anticipated Discharge Disposition (OT): home with 24/7 care, home with home health  Reason for Discharge: Discharge from facility  Discharge Destination: Home with assist (Pt  declined  services.)    Padmaja Cordova, OT  2/3/2023
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
## 2023-02-03 NOTE — PROGRESS NOTES
Patient Assessment Instrument  Quality Indicators - Discharge FY 2023    Section A. Transportation      Section A. Medication List        Section B. Health Literacy      Section C. BIMS      Section C. Signs and Symptoms of Delirium (from CAM)      Section D. Mood      Section D. Social Isolation      Section GG. Self-Care Performance      Section GG. Mobility Performance      Section J. Health Conditions Discharge  Fall(s) Since Admission:  No    Section J. Health Conditions (Pain)      Section K. Swallowing/Nutritional Status  Nutritional Approaches Past 7 Days:  None  Nutritional Approaches at Discharge:  None    Section M. Skin Conditions Discharge      . Current Number of Unhealed Pressure Ulcers      Section N. Medication    Medication Intervention: Not applicable - There were no potential clinically  significant medication issues identified since admission or patient is not  taking any medications.  Patient is taking medications in the following pharmacological classification:  E. Anticoagulant An indication is noted for all medications in the Anticoagulant  drug class. F. Antibiotic An indication is noted for all medications in the  Antibiotic drug class. I. Antiplatelet An indication is NOT noted for all  medications in the Antiplatelet drug class.    Section O. Special Treatments, Procedures, and Programs    Signed by: Marichuy Smiley, Supervisor

## 2023-02-03 NOTE — SIGNIFICANT NOTE
02/03/23 1438   PT Discharge Summary   Reason for Discharge Discharge from facility   Outcomes Achieved Patient able to partially acheive established goals   Discharge Destination Home with assist

## 2023-02-03 NOTE — PROGRESS NOTES
Rehabilitation Nursing  Inpatient Rehabilitation Plan of Care Note    Plan of Care  Copy from POCSafety    Potential for Injury (Active)  Current Status (1/24/2023 2:00:00 PM): RISK FOR FALLS  Weekly Goal: NO FALLS  Discharge Goal: NO FALLS    Body Systems    Integumentary (Active)  Current Status (1/24/2023 2:00:00 PM): RISK FOR SKIN BREAKDOWN  Weekly Goal: NO BREAKDOWN  Discharge Goal: NO BREAKDOWN    Signed by: Franco Murdock RN

## 2023-02-03 NOTE — PROGRESS NOTES
Occupational Therapy:    Physical Therapy: Individual: 25 minutes.    Speech Language Pathology:    Signed by: Jewell Kessler PTA

## 2023-02-03 NOTE — PROGRESS NOTES
Patient Assessment Instrument  Quality Indicators - Discharge FY 2023    Section A. Transportation      Section A. Medication List        Section B. Health Literacy      Section C. BIMS      Section C. Signs and Symptoms of Delirium (from CAM)      Section D. Mood      Section D. Social Isolation      Section GG. Self-Care Performance     Eating: Patient completed the activities by themself with no assistance from a  helper.   Oral Hygiene: [place] sets up or cleans up; patient completes activity. [place]  assists only prior to or following the activity.   Toileting Hygiene: : [place] provides verbal cues and/or touching/steadying  and/or contact guard assistance as patient completes activity.   Shower/Bathe Self: [place] provides verbal cues and/or touching/steadying and/or  contact guard assistance as patient completes activity.   Upper Body Dressing: [place] sets up or cleans up; patient completes activity.  [place] assists only prior to or following the activity.   Lower Body Dressing: [place] provides verbal cues and/or touching/steadying  and/or contact guard assistance as patient completes activity.   Putting On/Taking Off Footwear: [place] provides verbal cues and/or  touching/steadying and/or contact guard assistance as patient completes  activity.    Section GG. Mobility Performance      Section J. Health Conditions Discharge      Section J. Health Conditions (Pain)      Section K. Swallowing/Nutritional Status  Nutritional Approaches Past 7 Days:  Nutritional Approaches at Discharge:    Section M. Skin Conditions Discharge      . Current Number of Unhealed Pressure Ulcers      Section N. Medication        Section O. Special Treatments, Procedures, and Programs    Signed by: Padmaja Cordova, Occupational Therapist

## 2023-02-03 NOTE — PROGRESS NOTES
Patient Assessment Instrument  Quality Indicators - Discharge FY 2023    Section A. Transportation      Section A. Medication List        Section B. Health Literacy      Section C. BIMS      Section C. Signs and Symptoms of Delirium (from CAM)      Section D. Mood      Section D. Social Isolation      Section GG. Self-Care Performance      Section GG. Mobility Performance     Roll Left and Right: Patient completed the activities by themself with no  assistance from a helper.   Sit to Lying: Patient completed the activities by themself with no assistance  from a helper.   Lying to Sitting on Side of Bed: Patient completed the activities by themself  with no assistance from a helper.   Sit to Stand: [place] provides verbal cues and/or touching/steadying and/or  contact guard assistance as patient completes activity. Assistance may be  provided throughout the activity or intermittently.   Chair/Bed to Chair Transfer: [place] provides verbal cues and/or  touching/steadying and/or contact guard assistance as patient completes  activity. Assistance may be provided throughout the activity or intermittently.   Toilet Transfer [place] provides verbal cues and/or touching/steadying and/or  contact guard assistance as patient completes activity. Assistance may be  provided throughout the activity or intermittently.   Car Transfer: [place] provides verbal cues and/or touching/steadying and/or  contact guard assistance as patient completes activity. Assistance may be  provided throughout the activity or intermittently.   Walk 10 Feet:   [place] provides verbal cues and/or touching/steadying and/or  contact guard assistance as patient completes activity. Assistance may be  provided throughout the activity or intermittently.  Walk 50 Feet with 2 Turns:   [place] provides verbal cues and/or  touching/steadying and/or contact guard assistance as patient completes  activity. Assistance may be provided throughout the activity or  intermittently.  Walk 150 Feet:   [place] provides verbal cues and/or touching/steadying and/or  contact guard assistance as patient completes activity. Assistance may be  provided throughout the activity or intermittently.  Walking 10 Feet on Uneven Surfaces:   Not attempted due to medical or safety  concerns.  1 Step Over Curb or Up/Down Stair:   [place] provides verbal cues and/or  touching/steadying and/or contact guard assistance as patient completes  activity. Assistance may be provided throughout the activity or intermittently.  4 Steps Up and Down, With/Without Rail:   [place] provides verbal cues and/or  touching/steadying and/or contact guard assistance as patient completes  activity. Assistance may be provided throughout the activity or intermittently.  12 Steps Up and Down, With/Without Rail:   [place] provides verbal cues and/or  touching/steadying and/or contact guard assistance as patient completes  activity. Assistance may be provided throughout the activity or intermittently.  Picking up an Object:   Not attempted due to medical or safety concerns. Uses  Wheelchair and/or Scooter: No    Section J. Health Conditions Discharge      Section J. Health Conditions (Pain)      Section K. Swallowing/Nutritional Status  Nutritional Approaches Past 7 Days:  Nutritional Approaches at Discharge:    Section M. Skin Conditions Discharge      . Current Number of Unhealed Pressure Ulcers      Section N. Medication        Section O. Special Treatments, Procedures, and Programs    Signed by: Jewell Kessler PTA
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
## 2024-12-12 ENCOUNTER — TRANSCRIBE ORDERS (OUTPATIENT)
Dept: LAB | Facility: HOSPITAL | Age: 89
End: 2024-12-12
Payer: MEDICARE

## 2024-12-12 ENCOUNTER — HOSPITAL ENCOUNTER (OUTPATIENT)
Dept: GENERAL RADIOLOGY | Facility: HOSPITAL | Age: 89
Discharge: HOME | End: 2024-12-12
Admitting: FAMILY MEDICINE
Payer: MEDICARE

## 2024-12-12 DIAGNOSIS — J20.9 ACUTE BRONCHITIS, UNSPECIFIED ORGANISM: Primary | ICD-10-CM

## 2024-12-12 DIAGNOSIS — J20.9 ACUTE BRONCHITIS, UNSPECIFIED ORGANISM: ICD-10-CM

## 2024-12-12 PROCEDURE — 71046 X-RAY EXAM CHEST 2 VIEWS: CPT

## 2025-02-27 RX ORDER — ESOMEPRAZOLE MAGNESIUM 40 MG/1
40 CAPSULE, DELAYED RELEASE ORAL
COMMUNITY

## 2025-02-27 RX ORDER — FLUTICASONE FUROATE, UMECLIDINIUM BROMIDE AND VILANTEROL TRIFENATATE 100; 62.5; 25 UG/1; UG/1; UG/1
1 POWDER RESPIRATORY (INHALATION)
COMMUNITY

## 2025-02-27 RX ORDER — OMEPRAZOLE 40 MG/1
40 CAPSULE, DELAYED RELEASE ORAL DAILY
COMMUNITY

## 2025-03-06 ENCOUNTER — OFFICE VISIT (OUTPATIENT)
Dept: SURGERY | Facility: CLINIC | Age: OVER 89
End: 2025-03-06
Payer: MEDICARE

## 2025-03-06 VITALS
SYSTOLIC BLOOD PRESSURE: 133 MMHG | BODY MASS INDEX: 26.07 KG/M2 | HEIGHT: 69 IN | DIASTOLIC BLOOD PRESSURE: 69 MMHG | WEIGHT: 176 LBS

## 2025-03-06 DIAGNOSIS — R13.19 ESOPHAGEAL DYSPHAGIA: Primary | ICD-10-CM

## 2025-03-06 DIAGNOSIS — K21.9 GASTROESOPHAGEAL REFLUX DISEASE, UNSPECIFIED WHETHER ESOPHAGITIS PRESENT: ICD-10-CM

## 2025-03-06 PROCEDURE — 1159F MED LIST DOCD IN RCRD: CPT | Performed by: SURGERY

## 2025-03-06 PROCEDURE — 99203 OFFICE O/P NEW LOW 30 MIN: CPT | Performed by: SURGERY

## 2025-03-06 PROCEDURE — 1160F RVW MEDS BY RX/DR IN RCRD: CPT | Performed by: SURGERY

## 2025-03-06 RX ORDER — SODIUM CHLORIDE 0.9 % (FLUSH) 0.9 %
10 SYRINGE (ML) INJECTION AS NEEDED
OUTPATIENT
Start: 2025-03-06

## 2025-03-06 RX ORDER — SODIUM CHLORIDE 0.9 % (FLUSH) 0.9 %
10 SYRINGE (ML) INJECTION EVERY 12 HOURS SCHEDULED
OUTPATIENT
Start: 2025-03-06

## 2025-03-06 RX ORDER — SODIUM CHLORIDE 9 MG/ML
40 INJECTION, SOLUTION INTRAVENOUS AS NEEDED
OUTPATIENT
Start: 2025-03-06

## 2025-03-06 NOTE — PROGRESS NOTES
"Date of Service: 3/6/2025    Subjective   Jose Penn is a 89 y.o. male is being seen for consultation for GERD, dysphagia today at the request of Anil King MD    Chief complaint: GERD, dysphagia    Jose Penn is a 89 y.o. independent male who presents to my office with issues with intermittent dysphagia to solids, usually dry foods.  He also has GERD for about 10 years.  He is currently on omeprazole every morning.  If he forgets to take his medicine he has bad reflux and has to take Tums.  His symptoms are worse at night.  With his dysphagia he is eventually able to get the food down.    Reports occasional falls  He is on Eliquis    Past Medical History:   Diagnosis Date    Arthritis     Carotid stenosis     Headache     Hypertension     Stroke        Past Surgical History:   Procedure Laterality Date    CHOLECYSTECTOMY  2015    PROSTATE SURGERY           Family History   Problem Relation Age of Onset    No Known Problems Mother     Heart attack Father     Cancer Brother          Social History     Socioeconomic History    Marital status:    Tobacco Use    Smoking status: Former     Current packs/day: 0.00     Types: Cigarettes     Quit date:      Years since quittin.2     Passive exposure: Past    Smokeless tobacco: Never   Vaping Use    Vaping status: Never Used   Substance and Sexual Activity    Alcohol use: No    Drug use: No    Sexual activity: Defer                Review of Systems     14 pt ROS negative except for what is noted in HPI        Objective     Physical Exam:      25  1031   Weight: 79.8 kg (176 lb)    Body mass index is 25.98 kg/m².  Constitution: /69   Ht 175.3 cm (69.02\")   Wt 79.8 kg (176 lb)   BMI 25.98 kg/m²  . No acute distress   Head: Normocephalic, atraumatic.   Eyes: Aligned without strabismus. Conjunctivae noninjected   Ears, Nose, Mouth: , no lesions appreciated   Respiratory: Symmetric chest expansion. No respiratory distress. "   Gastrointestinal:  Soft, nontender, nondistended  Skin:  No cyanosis, clubbing or edema bilaterally    Lymphatics: No abnormal cervical or supraclavicular adenopathy  Neurologic: No gross deficits.  Alert and oriented x3  Psychiatric: Normal mood              Jose Penn is a 89 y.o.  male with who presents to my office with dysphagia and GERD    We discussed the workup to include an upper GI study as well as EGD.  We will likely be able to adjust his medications to control his symptoms.      BMI is >= 25 and <30. (Overweight) The following options were offered after discussion;: information on healthy weight added to patient's after visit summary               This document has been electronically signed by Dale Blue MD   March 6, 2025 10:50 EST    [facility]

## 2025-03-14 ENCOUNTER — HOSPITAL ENCOUNTER (OUTPATIENT)
Dept: GENERAL RADIOLOGY | Facility: HOSPITAL | Age: OVER 89
Discharge: HOME | End: 2025-03-14
Payer: MEDICARE

## 2025-03-14 DIAGNOSIS — R13.19 ESOPHAGEAL DYSPHAGIA: ICD-10-CM

## 2025-03-14 DIAGNOSIS — K21.9 GASTROESOPHAGEAL REFLUX DISEASE, UNSPECIFIED WHETHER ESOPHAGITIS PRESENT: ICD-10-CM

## 2025-03-14 PROCEDURE — 74246 X-RAY XM UPR GI TRC 2CNTRST: CPT

## 2025-05-06 ENCOUNTER — HOSPITAL ENCOUNTER (OUTPATIENT)
Dept: GENERAL RADIOLOGY | Facility: HOSPITAL | Age: OVER 89
Discharge: HOME | End: 2025-05-06
Payer: MEDICARE

## 2025-05-06 ENCOUNTER — TRANSCRIBE ORDERS (OUTPATIENT)
Dept: LAB | Facility: HOSPITAL | Age: OVER 89
End: 2025-05-06
Payer: MEDICARE

## 2025-05-06 DIAGNOSIS — M25.512 LEFT SHOULDER PAIN, UNSPECIFIED CHRONICITY: ICD-10-CM

## 2025-05-06 DIAGNOSIS — M54.2 CERVICALGIA: ICD-10-CM

## 2025-05-06 DIAGNOSIS — M25.511 RIGHT SHOULDER PAIN, UNSPECIFIED CHRONICITY: ICD-10-CM

## 2025-05-06 DIAGNOSIS — M54.2 CERVICALGIA: Primary | ICD-10-CM

## 2025-05-06 PROCEDURE — 73030 X-RAY EXAM OF SHOULDER: CPT

## 2025-05-06 PROCEDURE — 72040 X-RAY EXAM NECK SPINE 2-3 VW: CPT

## 2025-05-06 PROCEDURE — 73030 X-RAY EXAM OF SHOULDER: CPT | Performed by: RADIOLOGY

## 2025-05-06 PROCEDURE — 72040 X-RAY EXAM NECK SPINE 2-3 VW: CPT | Performed by: RADIOLOGY

## 2025-06-26 ENCOUNTER — TELEPHONE (OUTPATIENT)
Dept: SURGERY | Age: OVER 89
End: 2025-06-26
Payer: MEDICARE

## 2025-06-26 NOTE — TELEPHONE ENCOUNTER
You are scheduled for an EGD with Dr. Blue on July 1 2025 at 8:30am. Arrive at outpatient surgery on the ground floor of the hospital, opposite end of the ER location. Do not eat/drink anything after midnight the night prior to procedure. You must have a  with you on day of surgery. If you have any questions please call the office at [phone].

## 2025-07-01 ENCOUNTER — ANESTHESIA (OUTPATIENT)
Dept: PERIOP | Facility: HOSPITAL | Age: OVER 89
End: 2025-07-01
Payer: MEDICARE

## 2025-07-01 ENCOUNTER — HOSPITAL ENCOUNTER (OUTPATIENT)
Facility: HOSPITAL | Age: OVER 89
Setting detail: HOSPITAL OUTPATIENT SURGERY
Discharge: HOME | End: 2025-07-01
Attending: SURGERY | Admitting: SURGERY
Payer: MEDICARE

## 2025-07-01 ENCOUNTER — ANESTHESIA EVENT (OUTPATIENT)
Dept: PERIOP | Facility: HOSPITAL | Age: OVER 89
End: 2025-07-01
Payer: MEDICARE

## 2025-07-01 VITALS
HEART RATE: 52 BPM | HEIGHT: 69 IN | SYSTOLIC BLOOD PRESSURE: 173 MMHG | WEIGHT: 180 LBS | DIASTOLIC BLOOD PRESSURE: 90 MMHG | RESPIRATION RATE: 16 BRPM | TEMPERATURE: 97.4 F | BODY MASS INDEX: 26.66 KG/M2 | OXYGEN SATURATION: 97 %

## 2025-07-01 DIAGNOSIS — K21.9 GASTROESOPHAGEAL REFLUX DISEASE, UNSPECIFIED WHETHER ESOPHAGITIS PRESENT: ICD-10-CM

## 2025-07-01 DIAGNOSIS — R13.19 ESOPHAGEAL DYSPHAGIA: ICD-10-CM

## 2025-07-01 PROCEDURE — S0260 H&P FOR SURGERY: HCPCS | Performed by: SURGERY

## 2025-07-01 PROCEDURE — 25810000003 LACTATED RINGERS PER 1000 ML: Performed by: NURSE ANESTHETIST, CERTIFIED REGISTERED

## 2025-07-01 PROCEDURE — 25010000002 PROPOFOL 200 MG/20ML EMULSION: Performed by: NURSE ANESTHETIST, CERTIFIED REGISTERED

## 2025-07-01 PROCEDURE — 25010000002 LIDOCAINE PF 2% 2 % SOLUTION: Performed by: NURSE ANESTHETIST, CERTIFIED REGISTERED

## 2025-07-01 RX ORDER — ONDANSETRON 2 MG/ML
4 INJECTION INTRAMUSCULAR; INTRAVENOUS AS NEEDED
Status: DISCONTINUED | OUTPATIENT
Start: 2025-07-01 | End: 2025-07-01 | Stop reason: HOSPADM

## 2025-07-01 RX ORDER — MEPERIDINE HYDROCHLORIDE 25 MG/ML
12.5 INJECTION INTRAMUSCULAR; INTRAVENOUS; SUBCUTANEOUS
Status: DISCONTINUED | OUTPATIENT
Start: 2025-07-01 | End: 2025-07-01 | Stop reason: HOSPADM

## 2025-07-01 RX ORDER — MIDAZOLAM HYDROCHLORIDE 1 MG/ML
0.5 INJECTION, SOLUTION INTRAMUSCULAR; INTRAVENOUS
Status: DISCONTINUED | OUTPATIENT
Start: 2025-07-01 | End: 2025-07-01 | Stop reason: HOSPADM

## 2025-07-01 RX ORDER — IPRATROPIUM BROMIDE AND ALBUTEROL SULFATE 2.5; .5 MG/3ML; MG/3ML
3 SOLUTION RESPIRATORY (INHALATION) ONCE AS NEEDED
Status: DISCONTINUED | OUTPATIENT
Start: 2025-07-01 | End: 2025-07-01 | Stop reason: HOSPADM

## 2025-07-01 RX ORDER — SODIUM CHLORIDE 0.9 % (FLUSH) 0.9 %
10 SYRINGE (ML) INJECTION AS NEEDED
Status: DISCONTINUED | OUTPATIENT
Start: 2025-07-01 | End: 2025-07-01 | Stop reason: HOSPADM

## 2025-07-01 RX ORDER — SODIUM CHLORIDE, SODIUM LACTATE, POTASSIUM CHLORIDE, CALCIUM CHLORIDE 600; 310; 30; 20 MG/100ML; MG/100ML; MG/100ML; MG/100ML
100 INJECTION, SOLUTION INTRAVENOUS ONCE AS NEEDED
Status: DISCONTINUED | OUTPATIENT
Start: 2025-07-01 | End: 2025-07-01 | Stop reason: HOSPADM

## 2025-07-01 RX ORDER — SODIUM CHLORIDE, SODIUM LACTATE, POTASSIUM CHLORIDE, CALCIUM CHLORIDE 600; 310; 30; 20 MG/100ML; MG/100ML; MG/100ML; MG/100ML
INJECTION, SOLUTION INTRAVENOUS CONTINUOUS PRN
Status: DISCONTINUED | OUTPATIENT
Start: 2025-07-01 | End: 2025-07-01 | Stop reason: SURG

## 2025-07-01 RX ORDER — OXYCODONE AND ACETAMINOPHEN 5; 325 MG/1; MG/1
1 TABLET ORAL ONCE AS NEEDED
Status: DISCONTINUED | OUTPATIENT
Start: 2025-07-01 | End: 2025-07-01 | Stop reason: HOSPADM

## 2025-07-01 RX ORDER — SODIUM CHLORIDE 0.9 % (FLUSH) 0.9 %
10 SYRINGE (ML) INJECTION EVERY 12 HOURS SCHEDULED
Status: DISCONTINUED | OUTPATIENT
Start: 2025-07-01 | End: 2025-07-01 | Stop reason: HOSPADM

## 2025-07-01 RX ORDER — FENTANYL CITRATE 50 UG/ML
50 INJECTION, SOLUTION INTRAMUSCULAR; INTRAVENOUS
Status: DISCONTINUED | OUTPATIENT
Start: 2025-07-01 | End: 2025-07-01 | Stop reason: HOSPADM

## 2025-07-01 RX ORDER — SODIUM CHLORIDE 9 MG/ML
40 INJECTION, SOLUTION INTRAVENOUS AS NEEDED
Status: DISCONTINUED | OUTPATIENT
Start: 2025-07-01 | End: 2025-07-01 | Stop reason: HOSPADM

## 2025-07-01 RX ORDER — LIDOCAINE HYDROCHLORIDE 20 MG/ML
INJECTION, SOLUTION EPIDURAL; INFILTRATION; INTRACAUDAL; PERINEURAL AS NEEDED
Status: DISCONTINUED | OUTPATIENT
Start: 2025-07-01 | End: 2025-07-01 | Stop reason: SURG

## 2025-07-01 RX ORDER — SODIUM CHLORIDE, SODIUM LACTATE, POTASSIUM CHLORIDE, CALCIUM CHLORIDE 600; 310; 30; 20 MG/100ML; MG/100ML; MG/100ML; MG/100ML
125 INJECTION, SOLUTION INTRAVENOUS ONCE
Status: DISCONTINUED | OUTPATIENT
Start: 2025-07-01 | End: 2025-07-01 | Stop reason: HOSPADM

## 2025-07-01 RX ORDER — ESOMEPRAZOLE MAGNESIUM 40 MG/1
40 CAPSULE, DELAYED RELEASE ORAL DAILY
Qty: 30 CAPSULE | Refills: 1 | Status: SHIPPED | OUTPATIENT
Start: 2025-07-01 | End: 2026-07-01

## 2025-07-01 RX ORDER — PROPOFOL 10 MG/ML
INJECTION, EMULSION INTRAVENOUS AS NEEDED
Status: DISCONTINUED | OUTPATIENT
Start: 2025-07-01 | End: 2025-07-01 | Stop reason: SURG

## 2025-07-01 RX ADMIN — LIDOCAINE HYDROCHLORIDE 60 MG: 20 INJECTION, SOLUTION EPIDURAL; INFILTRATION; INTRACAUDAL; PERINEURAL at 10:32

## 2025-07-01 RX ADMIN — PROPOFOL 25 MG: 10 INJECTION, EMULSION INTRAVENOUS at 10:32

## 2025-07-01 RX ADMIN — SODIUM CHLORIDE, POTASSIUM CHLORIDE, SODIUM LACTATE AND CALCIUM CHLORIDE: 600; 310; 30; 20 INJECTION, SOLUTION INTRAVENOUS at 10:29

## 2025-07-01 NOTE — ANESTHESIA PREPROCEDURE EVALUATION
Anesthesia Evaluation     no history of anesthetic complications:   NPO Solid Status: > 8 hours  NPO Liquid Status: > 8 hours           Airway   Mallampati: I  TM distance: >3 FB  Neck ROM: full  No difficulty expected  Dental    (+) poor dentition    Pulmonary - normal exam   (+) a smoker Former, COPD, asthma,  Cardiovascular - normal exam    Patient on routine beta blocker and Beta blocker given within 24 hours of surgery    (+) hypertension, CAD, hyperlipidemia,  carotid artery disease      Neuro/Psych  (+) CVA, headaches  GI/Hepatic/Renal/Endo    (+) GERD    Musculoskeletal     Abdominal  - normal exam    Bowel sounds: normal.   Substance History      OB/GYN          Other   arthritis,                 Anesthesia Plan    ASA 4     general     intravenous induction     Anesthetic plan, risks, benefits, and alternatives have been provided, discussed and informed consent has been obtained with: patient.    CODE STATUS:

## 2025-07-01 NOTE — H&P
"Chief complaint: GERD, dysphagia     Jose Penn is a 89 y.o. independent male who presents to my office with issues with intermittent dysphagia to solids, usually dry foods.  He also has GERD for about 10 years.  He is currently on omeprazole every morning.  If he forgets to take his medicine he has bad reflux and has to take Tums.  His symptoms are worse at night.  With his dysphagia he is eventually able to get the food down.     Reports occasional falls  He is on Eliquis     No changes to his symptoms since previous evaluation. Sill has food intermittently get stuck in throat.     Medical History        Past Medical History:   Diagnosis Date    Arthritis      Carotid stenosis      Headache      Hypertension      Stroke              Surgical History         Past Surgical History:   Procedure Laterality Date    CHOLECYSTECTOMY   2015    PROSTATE SURGERY                         Family History   Problem Relation Age of Onset    No Known Problems Mother      Heart attack Father      Cancer Brother              Social History   Social History            Socioeconomic History    Marital status:    Tobacco Use    Smoking status: Former       Current packs/day: 0.00       Types: Cigarettes       Quit date:        Years since quittin.2       Passive exposure: Past    Smokeless tobacco: Never   Vaping Use    Vaping status: Never Used   Substance and Sexual Activity    Alcohol use: No    Drug use: No    Sexual activity: Defer                        Review of Systems                 14 pt ROS negative except for what is noted in HPI           Objective  Physical Exam:   Body mass index is 25.98 kg/m².  Constitution:  Ht 175.3 cm (69.02\")   Wt 79.8 kg (176 lb)   BMI 25.98 kg/m²  . No acute distress   Head: Normocephalic, atraumatic.   Eyes: Aligned without strabismus. Conjunctivae noninjected   Ears, Nose, Mouth: , no lesions appreciated   Respiratory: Symmetric chest expansion. No respiratory distress. "   Gastrointestinal:  Soft, nontender, nondistended  Skin:  No cyanosis, clubbing or edema bilaterally    Lymphatics: No abnormal cervical or supraclavicular adenopathy  Neurologic: No gross deficits.  Alert and oriented x3  Psychiatric: Normal mood            UGI with tertiary contractions of the distal esophagus and hiatal hernia. Narrowing at [facility] [address] [facility]        Jose Penn is a 89 y.o.  male with who presents to my office with dysphagia and GERD

## 2025-07-01 NOTE — ANESTHESIA POSTPROCEDURE EVALUATION
Patient: Jose Penn    Procedure Summary       Date: 07/01/25 Room / Location: [facility] OR  /  [facility] OR    Anesthesia Start: 1029 Anesthesia Stop: 1037    Procedure: ESOPHAGOGASTRODUODENOSCOPY with biopsy, possible dilation (Esophagus) Diagnosis:       Esophageal dysphagia      Gastroesophageal reflux disease, unspecified whether esophagitis present      (Esophageal dysphagia [R13.19])      (Gastroesophageal reflux disease, unspecified whether esophagitis present [K21.9])    Surgeons: Dale Blue MD Provider: Jose Alfredo Manrique MD    Anesthesia Type: general ASA Status: 4            Anesthesia Type: general    Vitals  Vitals Value Taken Time   /90 07/01/25 11:10   Temp 97.4 °F (36.3 °C) 07/01/25 10:40   Pulse 52 07/01/25 11:10   Resp 16 07/01/25 11:10   SpO2 97 % 07/01/25 11:10           Post Anesthesia Care and Evaluation    Patient location during evaluation: PHASE II  Patient participation: complete - patient participated  Level of consciousness: awake and alert  Pain score: 1  Pain management: adequate    Airway patency: patent  Anesthetic complications: No anesthetic complications  PONV Status: controlled  Cardiovascular status: acceptable  Respiratory status: acceptable  Hydration status: acceptable

## 2025-07-02 LAB — REF LAB TEST METHOD: NORMAL

## 2025-07-23 ENCOUNTER — OFFICE VISIT (OUTPATIENT)
Dept: SURGERY | Age: OVER 89
End: 2025-07-23
Payer: MEDICARE

## 2025-07-23 VITALS
DIASTOLIC BLOOD PRESSURE: 56 MMHG | SYSTOLIC BLOOD PRESSURE: 121 MMHG | HEIGHT: 69 IN | WEIGHT: 175 LBS | BODY MASS INDEX: 25.92 KG/M2

## 2025-07-23 DIAGNOSIS — K44.9 HIATAL HERNIA: ICD-10-CM

## 2025-07-23 DIAGNOSIS — R13.19 ESOPHAGEAL DYSPHAGIA: Primary | ICD-10-CM

## 2025-07-23 DIAGNOSIS — K21.9 GASTROESOPHAGEAL REFLUX DISEASE, UNSPECIFIED WHETHER ESOPHAGITIS PRESENT: ICD-10-CM

## 2025-07-23 PROCEDURE — 1159F MED LIST DOCD IN RCRD: CPT | Performed by: SURGERY

## 2025-07-23 PROCEDURE — 99212 OFFICE O/P EST SF 10 MIN: CPT | Performed by: SURGERY

## 2025-07-23 PROCEDURE — 1160F RVW MEDS BY RX/DR IN RCRD: CPT | Performed by: SURGERY

## 2025-07-23 RX ORDER — ESOMEPRAZOLE MAGNESIUM 40 MG/1
40 CAPSULE, DELAYED RELEASE ORAL DAILY
Qty: 30 CAPSULE | Refills: 1 | Status: SHIPPED | OUTPATIENT
Start: 2025-07-23 | End: 2026-07-23

## 2025-07-23 RX ORDER — ESOMEPRAZOLE MAGNESIUM 40 MG/1
40 CAPSULE, DELAYED RELEASE ORAL
Qty: 30 CAPSULE | Refills: 2 | Status: SHIPPED | OUTPATIENT
Start: 2025-07-23

## 2025-07-23 NOTE — PROGRESS NOTES
Patient Name:  Jose Penn  YOB: 1928  [id number]           General Surgery Office Follow Up    Date of Service: 07/23/25    Chief Complaint- f/u dysphagia and GERD    Subjective     Jose Penn is a 89 y.o. independent male who presents to my office with issues with intermittent dysphagia to solids, usually dry foods.  He also has GERD for about 10 years.  He is currently on omeprazole every morning.  If he forgets to take his medicine he has bad reflux and has to take Tums.  His symptoms are worse at night.  With his dysphagia he is eventually able to get the food down.     Reports occasional falls  He is on Eliquis    Patient had EGD which demonstrated a hiatal hernia.  He was switched to esomeprazole which helps his symptoms, once again if he takes his medicines.  He does not notice a huge difference between omeprazole and the esomeprazole.    Allergy:   Allergies   Allergen Reactions    Droperidol     Penicillins     Reglan [Metoclopramide]     Sulfa Antibiotics            PMHx:   Past Medical History:   Diagnosis Date    Arthritis     Asthma     Carotid stenosis     COPD (chronic obstructive pulmonary disease)     Coronary artery disease     Elevated cholesterol     GERD (gastroesophageal reflux disease)     Headache     Hypertension     Stroke          Past Surgical History:  Past Surgical History:   Procedure Laterality Date    CHOLECYSTECTOMY  2015    COLONOSCOPY      ENDOSCOPY N/A 7/1/2025    Procedure: ESOPHAGOGASTRODUODENOSCOPY with biopsy, possible dilation;  Surgeon: Dale Blue MD;  Location: [facility];  Service: Gastroenterology;  Laterality: N/A;    PROSTATE SURGERY           Family History:   Family History   Problem Relation Age of Onset    No Known Problems Mother     Heart attack Father     Cancer Brother          Social History:  Social History     Socioeconomic History    Marital status:    Tobacco Use    Smoking status: Former     Current packs/day: 0.00     " Types: Cigarettes     Quit date:      Years since quittin.5     Passive exposure: Past    Smokeless tobacco: Never   Vaping Use    Vaping status: Never Used   Substance and Sexual Activity    Alcohol use: No    Drug use: No    Sexual activity: Defer            Review of Systems     14 pt ROS negative except for what is noted in HPI       Objective     Physical Exam:      Vital Signs  /56   Ht 175.3 cm (69.02\")   Wt 79.4 kg (175 lb)   BMI 25.83 kg/m²     Body mass index is 25.83 kg/m².    Physical Exam:      Constitution: /56   Ht 175.3 cm (69.02\")   Wt 79.4 kg (175 lb)   BMI 25.83 kg/m²  . No acute distress.   Head: Normocephalic, atraumatic.   Eyes: Aligned without strabismus. Conjunctivae noninjected   Ears, Nose, Mouth: normal dentition. No lesions appreciated   Respiratory: Symmetric chest expansion. No respiratory distress.   Gastrointestinal:  soft, nondistended, nontender  Skin:  No cyanosis, clubbing or edema bilaterally.  Lymphatics: No abnormal cervical or supraclavicular lymphadenopathy appreciated   Neurologic: No gross deficits. Alert and oriented x3   Psychiatric: normal mood       Results Review: I have personally reviewed all of the recent lab and imaging results available at this time.     UGI with tertiary contractions of the distal esophagus and hiatal hernia. Narrowing at [facility]        Assessment & Plan     Assessment and Plan:  89 y.o.  male With hiatal hernia     Continue esomeprazole                  This document has been electronically signed by Dale Blue MD   2025 08:56 EDT    [facility] Surgery        "

## (undated) DEVICE — Device

## (undated) DEVICE — THE BITE BLOCK MAXI, LATEX FREE STRAP IS USED TO PROTECT THE ENDOSCOPE INSERTION TUBE FROM BEING BITTEN BY THE PATIENT.

## (undated) DEVICE — DEFENDO AIR WATER SUCTION AND BIOPSY VALVE KIT FOR  OLYMPUS: Brand: DEFENDO AIR/WATER/SUCTION AND BIOPSY VALVE

## (undated) DEVICE — FRCP BX RADJAW4 NDL 2.8 240CM LG OG BX40